# Patient Record
Sex: MALE | Race: WHITE | NOT HISPANIC OR LATINO | Employment: OTHER | URBAN - METROPOLITAN AREA
[De-identification: names, ages, dates, MRNs, and addresses within clinical notes are randomized per-mention and may not be internally consistent; named-entity substitution may affect disease eponyms.]

---

## 2021-09-15 ENCOUNTER — OFFICE VISIT (OUTPATIENT)
Dept: DERMATOLOGY | Facility: CLINIC | Age: 61
End: 2021-09-15
Payer: COMMERCIAL
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
## 2021-09-15 DIAGNOSIS — L82.1 SEBORRHEIC KERATOSES: Primary | ICD-10-CM

## 2021-09-15 PROCEDURE — 99999 PR PBB SHADOW E&M-EST. PATIENT-LVL II: CPT | Mod: PBBFAC,,, | Performed by: STUDENT IN AN ORGANIZED HEALTH CARE EDUCATION/TRAINING PROGRAM

## 2021-09-15 PROCEDURE — 1160F RVW MEDS BY RX/DR IN RCRD: CPT | Mod: CPTII,S$GLB,, | Performed by: STUDENT IN AN ORGANIZED HEALTH CARE EDUCATION/TRAINING PROGRAM

## 2021-09-15 PROCEDURE — 99203 OFFICE O/P NEW LOW 30 MIN: CPT | Mod: S$GLB,,, | Performed by: STUDENT IN AN ORGANIZED HEALTH CARE EDUCATION/TRAINING PROGRAM

## 2021-09-15 PROCEDURE — 1160F PR REVIEW ALL MEDS BY PRESCRIBER/CLIN PHARMACIST DOCUMENTED: ICD-10-PCS | Mod: CPTII,S$GLB,, | Performed by: STUDENT IN AN ORGANIZED HEALTH CARE EDUCATION/TRAINING PROGRAM

## 2021-09-15 PROCEDURE — 1159F PR MEDICATION LIST DOCUMENTED IN MEDICAL RECORD: ICD-10-PCS | Mod: CPTII,S$GLB,, | Performed by: STUDENT IN AN ORGANIZED HEALTH CARE EDUCATION/TRAINING PROGRAM

## 2021-09-15 PROCEDURE — 99999 PR PBB SHADOW E&M-EST. PATIENT-LVL II: ICD-10-PCS | Mod: PBBFAC,,, | Performed by: STUDENT IN AN ORGANIZED HEALTH CARE EDUCATION/TRAINING PROGRAM

## 2021-09-15 PROCEDURE — 99203 PR OFFICE/OUTPT VISIT, NEW, LEVL III, 30-44 MIN: ICD-10-PCS | Mod: S$GLB,,, | Performed by: STUDENT IN AN ORGANIZED HEALTH CARE EDUCATION/TRAINING PROGRAM

## 2021-09-15 PROCEDURE — 1159F MED LIST DOCD IN RCRD: CPT | Mod: CPTII,S$GLB,, | Performed by: STUDENT IN AN ORGANIZED HEALTH CARE EDUCATION/TRAINING PROGRAM

## 2023-01-28 ENCOUNTER — HOSPITAL ENCOUNTER (OUTPATIENT)
Facility: OTHER | Age: 63
Discharge: HOME | End: 2023-01-29
Attending: EMERGENCY MEDICINE | Admitting: EMERGENCY MEDICINE
Payer: COMMERCIAL

## 2023-01-28 DIAGNOSIS — R07.9 CHEST PAIN: ICD-10-CM

## 2023-01-28 DIAGNOSIS — R55 SYNCOPE, UNSPECIFIED SYNCOPE TYPE: Primary | ICD-10-CM

## 2023-01-28 DIAGNOSIS — R00.2 PALPITATIONS: ICD-10-CM

## 2023-01-28 LAB
ALBUMIN SERPL BCP-MCNC: 4.4 G/DL (ref 3.5–5.2)
ALP SERPL-CCNC: 69 U/L (ref 55–135)
ALT SERPL W/O P-5'-P-CCNC: 19 U/L (ref 10–44)
ANION GAP SERPL CALC-SCNC: 10 MMOL/L (ref 8–16)
AST SERPL-CCNC: 23 U/L (ref 10–40)
BASOPHILS # BLD AUTO: 0.05 K/UL (ref 0–0.2)
BASOPHILS NFR BLD: 0.9 % (ref 0–1.9)
BILIRUB SERPL-MCNC: 1 MG/DL (ref 0.1–1)
BNP SERPL-MCNC: 20 PG/ML (ref 0–99)
BUN SERPL-MCNC: 17 MG/DL (ref 6–20)
CALCIUM SERPL-MCNC: 9.5 MG/DL (ref 8.7–10.5)
CHLORIDE SERPL-SCNC: 106 MMOL/L (ref 95–110)
CO2 SERPL-SCNC: 24 MMOL/L (ref 23–29)
CREAT SERPL-MCNC: 1.1 MG/DL (ref 0.5–1.4)
CTP QC/QA: YES
DIFFERENTIAL METHOD: ABNORMAL
EOSINOPHIL # BLD AUTO: 0 K/UL (ref 0–0.5)
EOSINOPHIL NFR BLD: 0.5 % (ref 0–8)
ERYTHROCYTE [DISTWIDTH] IN BLOOD BY AUTOMATED COUNT: 12.3 % (ref 11.5–14.5)
EST. GFR  (NO RACE VARIABLE): >60 ML/MIN/1.73 M^2
GLUCOSE SERPL-MCNC: 108 MG/DL (ref 70–110)
HCT VFR BLD AUTO: 49.4 % (ref 40–54)
HCV AB SERPL QL IA: NEGATIVE
HGB BLD-MCNC: 16.6 G/DL (ref 14–18)
HIV 1+2 AB+HIV1 P24 AG SERPL QL IA: NEGATIVE
IMM GRANULOCYTES # BLD AUTO: 0.03 K/UL (ref 0–0.04)
IMM GRANULOCYTES NFR BLD AUTO: 0.5 % (ref 0–0.5)
LYMPHOCYTES # BLD AUTO: 1.3 K/UL (ref 1–4.8)
LYMPHOCYTES NFR BLD: 22.9 % (ref 18–48)
MAGNESIUM SERPL-MCNC: 2.1 MG/DL (ref 1.6–2.6)
MCH RBC QN AUTO: 30.5 PG (ref 27–31)
MCHC RBC AUTO-ENTMCNC: 33.6 G/DL (ref 32–36)
MCV RBC AUTO: 91 FL (ref 82–98)
MONOCYTES # BLD AUTO: 0.5 K/UL (ref 0.3–1)
MONOCYTES NFR BLD: 7.7 % (ref 4–15)
NEUTROPHILS # BLD AUTO: 3.9 K/UL (ref 1.8–7.7)
NEUTROPHILS NFR BLD: 67.5 % (ref 38–73)
NRBC BLD-RTO: 0 /100 WBC
PHOSPHATE SERPL-MCNC: 3.5 MG/DL (ref 2.7–4.5)
PLATELET # BLD AUTO: 233 K/UL (ref 150–450)
PMV BLD AUTO: 8.9 FL (ref 9.2–12.9)
POTASSIUM SERPL-SCNC: 4 MMOL/L (ref 3.5–5.1)
PROT SERPL-MCNC: 7.6 G/DL (ref 6–8.4)
RBC # BLD AUTO: 5.44 M/UL (ref 4.6–6.2)
SARS-COV-2 RDRP RESP QL NAA+PROBE: NEGATIVE
SODIUM SERPL-SCNC: 140 MMOL/L (ref 136–145)
TROPONIN I SERPL DL<=0.01 NG/ML-MCNC: <0.006 NG/ML (ref 0–0.03)
TROPONIN I SERPL DL<=0.01 NG/ML-MCNC: <0.006 NG/ML (ref 0–0.03)
TSH SERPL DL<=0.005 MIU/L-ACNC: 1.64 UIU/ML (ref 0.4–4)
WBC # BLD AUTO: 5.82 K/UL (ref 3.9–12.7)

## 2023-01-28 PROCEDURE — 93005 ELECTROCARDIOGRAM TRACING: CPT

## 2023-01-28 PROCEDURE — G0378 HOSPITAL OBSERVATION PER HR: HCPCS

## 2023-01-28 PROCEDURE — 80053 COMPREHEN METABOLIC PANEL: CPT | Performed by: EMERGENCY MEDICINE

## 2023-01-28 PROCEDURE — 25000003 PHARM REV CODE 250: Performed by: EMERGENCY MEDICINE

## 2023-01-28 PROCEDURE — 84484 ASSAY OF TROPONIN QUANT: CPT | Mod: 91 | Performed by: PHYSICIAN ASSISTANT

## 2023-01-28 PROCEDURE — 96361 HYDRATE IV INFUSION ADD-ON: CPT

## 2023-01-28 PROCEDURE — 86803 HEPATITIS C AB TEST: CPT | Performed by: EMERGENCY MEDICINE

## 2023-01-28 PROCEDURE — 93010 ELECTROCARDIOGRAM REPORT: CPT | Mod: ,,, | Performed by: INTERNAL MEDICINE

## 2023-01-28 PROCEDURE — 63600175 PHARM REV CODE 636 W HCPCS: Performed by: PHYSICIAN ASSISTANT

## 2023-01-28 PROCEDURE — 85025 COMPLETE CBC W/AUTO DIFF WBC: CPT | Performed by: EMERGENCY MEDICINE

## 2023-01-28 PROCEDURE — 83880 ASSAY OF NATRIURETIC PEPTIDE: CPT | Performed by: EMERGENCY MEDICINE

## 2023-01-28 PROCEDURE — 84484 ASSAY OF TROPONIN QUANT: CPT | Performed by: EMERGENCY MEDICINE

## 2023-01-28 PROCEDURE — 99285 EMERGENCY DEPT VISIT HI MDM: CPT | Mod: 25

## 2023-01-28 PROCEDURE — 87635 SARS-COV-2 COVID-19 AMP PRB: CPT | Performed by: PHYSICIAN ASSISTANT

## 2023-01-28 PROCEDURE — 87389 HIV-1 AG W/HIV-1&-2 AB AG IA: CPT | Performed by: EMERGENCY MEDICINE

## 2023-01-28 PROCEDURE — 84443 ASSAY THYROID STIM HORMONE: CPT | Performed by: EMERGENCY MEDICINE

## 2023-01-28 PROCEDURE — 84100 ASSAY OF PHOSPHORUS: CPT | Performed by: EMERGENCY MEDICINE

## 2023-01-28 PROCEDURE — 93010 EKG 12-LEAD: ICD-10-PCS | Mod: ,,, | Performed by: INTERNAL MEDICINE

## 2023-01-28 PROCEDURE — 83735 ASSAY OF MAGNESIUM: CPT | Performed by: EMERGENCY MEDICINE

## 2023-01-28 PROCEDURE — 96360 HYDRATION IV INFUSION INIT: CPT

## 2023-01-28 RX ORDER — ASPIRIN 325 MG
325 TABLET ORAL
Status: COMPLETED | OUTPATIENT
Start: 2023-01-28 | End: 2023-01-28

## 2023-01-28 RX ORDER — SODIUM CHLORIDE 0.9 % (FLUSH) 0.9 %
10 SYRINGE (ML) INJECTION
Status: DISCONTINUED | OUTPATIENT
Start: 2023-01-28 | End: 2023-01-29 | Stop reason: HOSPADM

## 2023-01-28 RX ORDER — TALC
6 POWDER (GRAM) TOPICAL NIGHTLY PRN
Status: DISCONTINUED | OUTPATIENT
Start: 2023-01-28 | End: 2023-01-29 | Stop reason: HOSPADM

## 2023-01-28 RX ORDER — ACETAMINOPHEN 325 MG/1
650 TABLET ORAL EVERY 8 HOURS PRN
Status: DISCONTINUED | OUTPATIENT
Start: 2023-01-28 | End: 2023-01-29 | Stop reason: HOSPADM

## 2023-01-28 RX ORDER — SODIUM CHLORIDE, SODIUM LACTATE, POTASSIUM CHLORIDE, CALCIUM CHLORIDE 600; 310; 30; 20 MG/100ML; MG/100ML; MG/100ML; MG/100ML
INJECTION, SOLUTION INTRAVENOUS CONTINUOUS
Status: DISCONTINUED | OUTPATIENT
Start: 2023-01-28 | End: 2023-01-29 | Stop reason: HOSPADM

## 2023-01-28 RX ORDER — ONDANSETRON 2 MG/ML
4 INJECTION INTRAMUSCULAR; INTRAVENOUS EVERY 8 HOURS PRN
Status: DISCONTINUED | OUTPATIENT
Start: 2023-01-28 | End: 2023-01-29 | Stop reason: HOSPADM

## 2023-01-28 RX ADMIN — SODIUM CHLORIDE, POTASSIUM CHLORIDE, SODIUM LACTATE AND CALCIUM CHLORIDE: 600; 310; 30; 20 INJECTION, SOLUTION INTRAVENOUS at 04:01

## 2023-01-28 RX ADMIN — ASPIRIN 325 MG ORAL TABLET 325 MG: 325 PILL ORAL at 12:01

## 2023-01-28 RX ADMIN — SODIUM CHLORIDE, POTASSIUM CHLORIDE, SODIUM LACTATE AND CALCIUM CHLORIDE: 600; 310; 30; 20 INJECTION, SOLUTION INTRAVENOUS at 08:01

## 2023-01-28 NOTE — ED PROVIDER NOTES
"Encounter Date: 1/28/2023    SCRIBE #1 NOTE: I, Idalmis Parker, am scribing for, and in the presence of,  Alayna iDallo MD. I have scribed the following portions of the note - Other sections scribed: HPI, ROS, PE.     History     Chief Complaint   Patient presents with    Palpitations     C/o intermitting palpitations, nausea and LOC that began last night 8pm. -SOB. Unsure of head trauma. VSS     Ten Parr is a 62 y.o. male who presents to the ED with heart palpitations last night. He reports that he began to feel "fluttering" 16 hours ago, but this subsided and he went to sleep. He then reports that he woke up to palpitations again but is unsure of the time. He states that he experienced associated nausea, diffuse abdominal pain, and chest pain. He describes the pain as sharp at onset and then as a burning that radiated "all over." He reports that he attempted to get out of bed to use the restroom 3 times, but was unable to get up due to weakness. During these attempts, he notes that he was also experiencing waves of heart palpitations with his heart rate fluctuating between 60 BPM and 140 BPM. He states that he was eventually able to make it to the restroom but on the way back to bed he experienced a syncopal episode and "woke up on the side of the bed." He denies dyspnea or extremity swelling. He reports that after LOC, he was still experiencing heart palpitations for an unknown period of time but was eventually able to get back to sleep. He states that he was prompted to this ED after explaining this episode to a friend who is a nurse. Pt states that he is currently experiencing chest soreness but is otherwise currently asymptomatic.This is the extent of the patient's complaints at this time.    The history is provided by the patient.   Review of patient's allergies indicates:  No Known Allergies  History reviewed. No pertinent past medical history.  Past Surgical History:   Procedure Laterality Date    " RHINOPLASTY TIP       Family History   Problem Relation Age of Onset    Melanoma Neg Hx      Social History     Tobacco Use    Smoking status: Former     Packs/day: 1.00     Years: 15.00     Pack years: 15.00     Types: Cigarettes     Start date: 9/9/2002    Smokeless tobacco: Never   Substance Use Topics    Alcohol use: Yes     Comment: socially     Review of Systems   Constitutional:  Negative for fever.   HENT:  Negative for sore throat.    Respiratory:  Negative for shortness of breath.    Cardiovascular:  Positive for chest pain and palpitations.   Gastrointestinal:  Positive for abdominal pain and nausea.   Genitourinary:  Negative for dysuria.   Musculoskeletal:  Negative for back pain.        Negative for extremity swelling.    Skin:  Negative for rash.   Neurological:  Positive for syncope and weakness.   Hematological:  Does not bruise/bleed easily.     Physical Exam     Initial Vitals [01/28/23 1155]   BP Pulse Resp Temp SpO2   (!) 152/79 73 17 98.3 °F (36.8 °C) 98 %      MAP       --         Physical Exam    Nursing note and vitals reviewed.  Constitutional: He appears well-developed and well-nourished.   HENT:   Head: Normocephalic and atraumatic.   Eyes: EOM are normal. Pupils are equal, round, and reactive to light.   Neck: Neck supple.   Normal range of motion.  Cardiovascular:  Normal rate, regular rhythm, normal heart sounds and intact distal pulses.           Pulmonary/Chest: Breath sounds normal.   Abdominal: Abdomen is soft. Bowel sounds are normal. He exhibits no distension. There is no abdominal tenderness. There is no rebound and no guarding.   Musculoskeletal:         General: No edema. Normal range of motion.      Cervical back: Normal range of motion and neck supple.     Neurological: He is alert and oriented to person, place, and time.   Skin: Skin is warm and dry.   Psychiatric: He has a normal mood and affect. His behavior is normal. Judgment and thought content normal.       ED Course    Procedures  Labs Reviewed   CBC W/ AUTO DIFFERENTIAL - Abnormal; Notable for the following components:       Result Value    MPV 8.9 (*)     All other components within normal limits    Narrative:     Release to patient->Immediate   COMPREHENSIVE METABOLIC PANEL    Narrative:     Release to patient->Immediate   TROPONIN I    Narrative:     Release to patient->Immediate   B-TYPE NATRIURETIC PEPTIDE    Narrative:     Release to patient->Immediate   TSH    Narrative:     Release to patient->Immediate   HIV 1 / 2 ANTIBODY    Narrative:     Release to patient->Immediate   HEPATITIS C ANTIBODY    Narrative:     Release to patient->Immediate   TROPONIN I     EKG Readings: (Independently Interpreted)   1143 Rate of 74 BPM. Normal sinus rhythm with right bundle branch block. No change from EKG from 2014.      Imaging Results              X-Ray Chest AP Portable (Final result)  Result time 01/28/23 14:05:08      Final result by Miguel Dickson MD (01/28/23 14:05:08)                   Impression:      As above.      Electronically signed by: Miguel Dickson MD  Date:    01/28/2023  Time:    14:05               Narrative:    EXAMINATION:  XR CHEST AP PORTABLE    CLINICAL HISTORY:  Chest Pain;    TECHNIQUE:  Single frontal view of the chest was performed.    FINDINGS:  The lungs are clear.  There is no pneumothorax or pleural fluid.  The cardiac silhouette is not enlarged.  The osseous structures are unremarkable.                                    X-Rays:   Independently Interpreted Readings:   Chest X-Ray: No infiltrate or effusion. No cardiomegaly.    Medications   aspirin tablet 325 mg (325 mg Oral Given 1/28/23 1255)     Medical Decision Making:   History:   Old Medical Records: I decided to obtain old medical records.  Independently Interpreted Test(s):   I have ordered and independently interpreted X-rays - see prior notes.  I have ordered and independently interpreted EKG Reading(s) - see prior notes  Clinical Tests:   Lab  Tests: Ordered and Reviewed  Radiological Study: Ordered and Reviewed  Medical Tests: Ordered and Reviewed  ED Management:  Medical decision-making:   The patient had palpitations for several hours during the night last night.  He was too weak to get out of bed and had urinate so when he finally did get out of bed he had syncopal episode and woke up on the floor.  In the emergency department, the patient has maintained normal sinus rhythm with no palpitations or arrhythmias.  Has a history of a right bundle branch block which is unchanged from an EKG in 2014.  He needs admission for syncopal episode and monitoring for his palpitations.    Differential diagnosis:  Atrial fibrillation, atrial flutter, SVT, V-tach, STEMI, NSTEMI, PVCs    History provided by the patient                   I, Alayna Diallo, personally performed the services described in this documentation. All medical record entries made by the scribe were at my direction and in my presence.  I have reviewed the chart and agree that the record reflects my personal performance and is accurate and complete. Alayna Diallo M.D. 2:22 PM01/28/2023          Clinical Impression:   Final diagnoses:  [R00.2] Palpitations  [R07.9] Chest pain        ED Disposition Condition    Observation Stable                Alayna Diallo MD  01/28/23 9039

## 2023-01-28 NOTE — Clinical Note
Diagnosis: Palpitations [785.1.ICD-9-CM]   Future Attending Provider: DIOR ABARCA [[id number]]   Is the patient being sent to ED Observation?: Yes   Admitting Provider:: DIOR ABARCA [[id number]]

## 2023-01-28 NOTE — H&P
"ED Observation Unit  History and Physical      I assumed care of this patient from the Main ED at onset of observation time, 2:55 PM  on 01/28/2023.       History of Present Illness:    Ten Parr is a 62 y.o. male who presents to the ED with heart palpitations last night. He reports that he began to feel "fluttering" 16 hours ago, but this subsided and he went to sleep. He then reports that he woke up to palpitations again but is unsure of the time. He states that he experienced associated nausea, diffuse abdominal pain, and chest pain. He describes the pain as sharp at onset and then as a burning that radiated "all over." He reports that he attempted to get out of bed to use the restroom 3 times, but was unable to get up due to weakness. During these attempts, he notes that he was also experiencing waves of heart palpitations with his heart rate fluctuating between 60 BPM and 140 BPM. He states that he was eventually able to make it to the restroom but on the way back to bed he experienced a syncopal episode and "woke up on the side of the bed." He denies dyspnea or extremity swelling. He reports that after LOC, he was still experiencing heart palpitations for an unknown period of time but was eventually able to get back to sleep. He states that he was prompted to this ED after explaining this episode to a friend who is a nurse. Pt states that he is currently experiencing chest soreness but is otherwise currently asymptomatic.This is the extent of the patient's complaints at this time.    I reviewed the ED Provider Note dated 01/28/2023  prior to my evaluation of this patient.  I reviewed all labs and imaging performed in the Main ED, prior to patient being placed in Observation. Patient was placed in the ED Observation Unit for syncope    ED course EKG reveals right bundle-branch block unchanged from previous.  Initial workup unremarkable.  PMHx   History reviewed. No pertinent past medical history. " "  Past Surgical History:   Procedure Laterality Date    RHINOPLASTY TIP          Family Hx   Family History   Problem Relation Age of Onset    Melanoma Neg Hx         Social Hx   Social History     Socioeconomic History    Marital status: Single   Tobacco Use    Smoking status: Former     Packs/day: 1.00     Years: 15.00     Pack years: 15.00     Types: Cigarettes     Start date: 9/9/2002    Smokeless tobacco: Never   Substance and Sexual Activity    Alcohol use: Yes     Comment: socially        Vital Signs   Vitals:    01/28/23 1155 01/28/23 1302 01/28/23 1332 01/28/23 1433   BP: (!) 152/79 130/82 132/82 (!) 126/93   BP Location: Left arm      Patient Position: Sitting      Pulse: 73 68 68 69   Resp: 17 20 20 19   Temp: 98.3 °F (36.8 °C)      TempSrc: Oral      SpO2: 98% 98% 99% 97%   Weight: 72.6 kg (160 lb)      Height: 5' 8" (1.727 m)           Review of Systems  See HPI    Physical Exam  Nursing note and vitals reviewed.  Constitutional: He appears well-developed and well-nourished.   HENT:   Head: Normocephalic and atraumatic.   Eyes: EOM are normal. Pupils are equal, round, and reactive to light.   Neck: Neck supple.   Normal range of motion.  Cardiovascular:  Normal rate, regular rhythm, normal heart sounds and intact distal pulses.           Pulmonary/Chest: Breath sounds normal.   Abdominal: Abdomen is soft. Bowel sounds are normal. He exhibits no distension. There is no abdominal tenderness. There is no rebound and no guarding.   Musculoskeletal:         General: No edema. Normal range of motion.      Cervical back: Normal range of motion and neck supple.      Neurological: He is alert and oriented to person, place, and time.   Skin: Skin is warm and dry.   Psychiatric: He has a normal mood and affect. His behavior is normal. Judgment and thought content normal.         Medications:   Scheduled Meds:  Continuous Infusions:   lactated ringers       PRN Meds:.acetaminophen, melatonin, ondansetron, sodium " chloride 0.9%      Assessment/Plan:  1. Palpitations    2. Chest pain      Syncopal episode at some point in the night.  Preceding with nausea and diaphoresis also reported some chest discomfort.  States last episode of discomfort was earlier today.  Denies any recurrent syncopal episodes   Described as discomfort.  Received 325 aspirin in ED    Case was discussed with the ED provider, and cardiology

## 2023-01-28 NOTE — ED TRIAGE NOTES
"Pt presents to the ED w/ c/o syncopal episode yesterday after experiencing palpitations. Pt unsure if he hit his head or how long he was unconscious for. Pt denies blood thinner use. Pt states "I feel palpitations every now and then but it always goes away so I never got it checked out." Pt denies SOB.   "

## 2023-01-29 VITALS
OXYGEN SATURATION: 96 % | WEIGHT: 168 LBS | BODY MASS INDEX: 25.46 KG/M2 | DIASTOLIC BLOOD PRESSURE: 59 MMHG | TEMPERATURE: 98 F | HEART RATE: 65 BPM | HEIGHT: 68 IN | SYSTOLIC BLOOD PRESSURE: 122 MMHG | RESPIRATION RATE: 18 BRPM

## 2023-01-29 LAB
ALBUMIN SERPL BCP-MCNC: 3.5 G/DL (ref 3.5–5.2)
ALP SERPL-CCNC: 57 U/L (ref 55–135)
ALT SERPL W/O P-5'-P-CCNC: 17 U/L (ref 10–44)
ANION GAP SERPL CALC-SCNC: 4 MMOL/L (ref 8–16)
AST SERPL-CCNC: 18 U/L (ref 10–40)
BASOPHILS # BLD AUTO: 0.04 K/UL (ref 0–0.2)
BASOPHILS NFR BLD: 0.8 % (ref 0–1.9)
BILIRUB SERPL-MCNC: 0.7 MG/DL (ref 0.1–1)
BUN SERPL-MCNC: 16 MG/DL (ref 6–20)
CALCIUM SERPL-MCNC: 9 MG/DL (ref 8.7–10.5)
CHLORIDE SERPL-SCNC: 110 MMOL/L (ref 95–110)
CO2 SERPL-SCNC: 27 MMOL/L (ref 23–29)
CREAT SERPL-MCNC: 1 MG/DL (ref 0.5–1.4)
DIFFERENTIAL METHOD: NORMAL
EOSINOPHIL # BLD AUTO: 0.1 K/UL (ref 0–0.5)
EOSINOPHIL NFR BLD: 2.1 % (ref 0–8)
ERYTHROCYTE [DISTWIDTH] IN BLOOD BY AUTOMATED COUNT: 12.3 % (ref 11.5–14.5)
EST. GFR  (NO RACE VARIABLE): >60 ML/MIN/1.73 M^2
GLUCOSE SERPL-MCNC: 96 MG/DL (ref 70–110)
HCT VFR BLD AUTO: 42.4 % (ref 40–54)
HGB BLD-MCNC: 14.3 G/DL (ref 14–18)
IMM GRANULOCYTES # BLD AUTO: 0.01 K/UL (ref 0–0.04)
IMM GRANULOCYTES NFR BLD AUTO: 0.2 % (ref 0–0.5)
LYMPHOCYTES # BLD AUTO: 2 K/UL (ref 1–4.8)
LYMPHOCYTES NFR BLD: 40.8 % (ref 18–48)
MCH RBC QN AUTO: 30.8 PG (ref 27–31)
MCHC RBC AUTO-ENTMCNC: 33.7 G/DL (ref 32–36)
MCV RBC AUTO: 91 FL (ref 82–98)
MONOCYTES # BLD AUTO: 0.4 K/UL (ref 0.3–1)
MONOCYTES NFR BLD: 8.5 % (ref 4–15)
NEUTROPHILS # BLD AUTO: 2.3 K/UL (ref 1.8–7.7)
NEUTROPHILS NFR BLD: 47.6 % (ref 38–73)
NRBC BLD-RTO: 0 /100 WBC
PLATELET # BLD AUTO: 193 K/UL (ref 150–450)
PMV BLD AUTO: 9.3 FL (ref 9.2–12.9)
POTASSIUM SERPL-SCNC: 4.5 MMOL/L (ref 3.5–5.1)
PROT SERPL-MCNC: 5.9 G/DL (ref 6–8.4)
RBC # BLD AUTO: 4.64 M/UL (ref 4.6–6.2)
SODIUM SERPL-SCNC: 141 MMOL/L (ref 136–145)
WBC # BLD AUTO: 4.85 K/UL (ref 3.9–12.7)

## 2023-01-29 PROCEDURE — 63600175 PHARM REV CODE 636 W HCPCS: Performed by: PHYSICIAN ASSISTANT

## 2023-01-29 PROCEDURE — 85025 COMPLETE CBC W/AUTO DIFF WBC: CPT | Performed by: PHYSICIAN ASSISTANT

## 2023-01-29 PROCEDURE — G0378 HOSPITAL OBSERVATION PER HR: HCPCS

## 2023-01-29 PROCEDURE — 96361 HYDRATE IV INFUSION ADD-ON: CPT

## 2023-01-29 PROCEDURE — 80053 COMPREHEN METABOLIC PANEL: CPT | Performed by: PHYSICIAN ASSISTANT

## 2023-01-29 RX ADMIN — SODIUM CHLORIDE, POTASSIUM CHLORIDE, SODIUM LACTATE AND CALCIUM CHLORIDE: 600; 310; 30; 20 INJECTION, SOLUTION INTRAVENOUS at 06:01

## 2023-01-29 NOTE — ED NOTES
Hourly rounding complete. Pt resting comfortably in bed, AAO x4. Resp even & unlabored, with visible cheat rise and fall. Patient denies dyspnea, pain, or any subsequent episodes of palpitations. Pt ambulatory from restroom with no associated dyspnea, dizziness, or weakness. Patient updated on plan of care. Comfort/restroom needs addressed. Call light and personal items within reach. Bed in lowest position, wheels locked, siderails up x2. Pt on continuous cardiac monitoring and remains in normal sinus rhythm with regular rate.

## 2023-01-29 NOTE — DISCHARGE SUMMARY
"[facility] ED Observation Unit  Discharge Summary        History of Present Illness:    Ten Parr is a 62 y.o. male who presents to the ED with heart palpitations last night. He reports that he began to feel "fluttering" 16 hours ago, but this subsided and he went to sleep. He then reports that he woke up to palpitations again but is unsure of the time. He states that he experienced associated nausea, diffuse abdominal pain, and chest pain. He describes the pain as sharp at onset and then as a burning that radiated "all over." He reports that he attempted to get out of bed to use the restroom 3 times, but was unable to get up due to weakness. During these attempts, he notes that he was also experiencing waves of heart palpitations with his heart rate fluctuating between 60 BPM and 140 BPM. He states that he was eventually able to make it to the restroom but on the way back to bed he experienced a syncopal episode and "woke up on the side of the bed." He denies dyspnea or extremity swelling. He reports that after LOC, he was still experiencing heart palpitations for an unknown period of time but was eventually able to get back to sleep. He states that he was prompted to this ED after explaining this episode to a friend who is a nurse. Pt states that he is currently experiencing chest soreness but is otherwise currently asymptomatic.This is the extent of the patient's complaints at this time.     Observation Course:    CBC and CMP grossly unremarkable.  Troponin negative x2.  BNP and TSH WNL.  No acute cardiopulmonary process identified on CXR.  EKG with right bundle branch block unchanged from prior. Patient has had no further episodes of palpitations or syncope while in the ER or EDOU.  Patient was placed in the ED observation unit for evaluation by Cardiology but he states that he has a [employer] to go to and he would prefer to follow-up outpatient as he can not stay any longer.  Workup is reassuring    The " patient displays normal decision making capacity. I explained to the patient the nature of his/her illness, injury, or disease, and the need and advisability of continued in-hospital evaluation / treatment. I explained that the risks of discontinuing medical care at this time include worsening of condition, bleeding, infection, permanent disability, MI, coma, and death. All the patient's questions were answered.  Nonetheless, the patient chooses to leave.  He states that he will absolutely follow-up with cardiology as an outpatient and referral placed.  He would stay if he was able to but he can not miss this [employer] that has been planned for a very long time and is incredibly important to him.  Patient discharged in good condition.  Patient encouraged to return with any new or worsening symptoms.  Patient educated on signs and symptoms to monitor for and when to return to ED. Patient verbalized understanding agrees with treatment plan. All questions and concerns addressed.         Brief Physical Exam/Reassessment   Review of Systems   Constitutional:  Negative for chills and fever.   Eyes:  Negative for blurred vision and double vision.   Respiratory:  Negative for cough and shortness of breath.    Cardiovascular:  Positive for palpitations (denies currently). Negative for chest pain, leg swelling and PND.   Gastrointestinal:  Negative for abdominal pain, nausea and vomiting.   Genitourinary:  Negative for dysuria and flank pain.   Musculoskeletal:  Negative for back pain and myalgias.   Skin:  Negative for rash.   Neurological:  Positive for loss of consciousness. Negative for dizziness, weakness and headaches.   Endo/Heme/Allergies:  Does not bruise/bleed easily.   Psychiatric/Behavioral:  Negative for depression.      Physical Exam    Constitutional: Vital signs are normal. He appears well-developed and well-nourished. He is cooperative.  Non-toxic appearance. He does not have a sickly appearance. No distress.    HENT:   Head: Normocephalic and atraumatic.   Eyes: EOM are normal. Pupils are equal, round, and reactive to light. No scleral icterus.   Neck:   Normal range of motion.  Cardiovascular:  Normal rate, regular rhythm and normal heart sounds.           Pulmonary/Chest: Breath sounds normal. No respiratory distress. He exhibits no tenderness.   Abdominal: Abdomen is soft. Bowel sounds are normal. He exhibits no distension. There is no abdominal tenderness.   Musculoskeletal:         General: No tenderness. Normal range of motion.      Cervical back: Normal range of motion.     Neurological: He is alert and oriented to person, place, and time. He has normal strength. No sensory deficit. GCS score is 15. GCS eye subscore is 4. GCS verbal subscore is 5. GCS motor subscore is 6.   Skin: Skin is warm and dry. Capillary refill takes less than 2 seconds. No erythema.   Psychiatric: He has a normal mood and affect. Thought content normal.       Consultants:    Dr. Gonzales (patient left prior to evaluation)    ED/OBS Workup:  Vitals:    01/29/23 0410 01/29/23 0603 01/29/23 0607 01/29/23 0700   BP:   (!) 122/59    Pulse: (!) 56 (!) 56 63 62   Resp:   18    Temp:   98 °F (36.7 °C)    TempSrc:   Oral    SpO2:   96%    Weight:       Height:        01/29/23 0800   BP:    Pulse: 65   Resp:    Temp:    TempSrc:    SpO2:    Weight:    Height:        Labs Reviewed   CBC W/ AUTO DIFFERENTIAL - Abnormal; Notable for the following components:       Result Value    MPV 8.9 (*)     All other components within normal limits    Narrative:     Release to patient->Immediate   COMPREHENSIVE METABOLIC PANEL - Abnormal; Notable for the following components:    Total Protein 5.9 (*)     Anion Gap 4 (*)     All other components within normal limits   COMPREHENSIVE METABOLIC PANEL    Narrative:     Release to patient->Immediate   TROPONIN I    Narrative:     Release to patient->Immediate   B-TYPE NATRIURETIC PEPTIDE    Narrative:     Release to  patient->Immediate   TSH    Narrative:     Release to patient->Immediate   HIV 1 / 2 ANTIBODY    Narrative:     Release to patient->Immediate   HEPATITIS C ANTIBODY    Narrative:     Release to patient->Immediate   MAGNESIUM   PHOSPHORUS   MAGNESIUM    Narrative:     Release to patient->Immediate   PHOSPHORUS    Narrative:     Release to patient->Immediate   TROPONIN I   CBC W/ AUTO DIFFERENTIAL   SARS-COV-2 RDRP GENE       X-Ray Chest AP Portable   Final Result      As above.         Electronically signed by: Miguel Dickson MD   Date:    01/28/2023   Time:    14:05          Final Diagnosis:  1. Syncope, unspecified syncope type    2. Palpitations    3. Chest pain        Discharge Condition: Good    Disposition: Home or Self Care     Time spent on the discharge of the patient including review of hospital course with the patient. reviewing discharge medications and arranging follow-up care 35 minutes.  Patient was seen and examined on the date of discharge and determined to be suitable for discharge.    Follow Up:   ED Disposition Condition    Discharge Good            ED Prescriptions    None       Follow-up Information       Follow up With Specialties Details Why Contact Info    Pedro Gonzales MD Interventional Cardiology, Nuclear Medicine, Cardiovascular Disease, Cardiology   [address] [address]  [phone]              No future appointments.

## 2023-01-29 NOTE — ED NOTES
After updating on plan of care, patient informed RN that he would like to leave AMA. ED provider messaged via secure chat.

## 2023-02-16 ENCOUNTER — OFFICE VISIT (OUTPATIENT)
Dept: CARDIOLOGY | Facility: CLINIC | Age: 63
End: 2023-02-16
Payer: COMMERCIAL

## 2023-02-16 VITALS
HEIGHT: 68 IN | OXYGEN SATURATION: 98 % | WEIGHT: 161.5 LBS | HEART RATE: 84 BPM | BODY MASS INDEX: 24.48 KG/M2 | DIASTOLIC BLOOD PRESSURE: 80 MMHG | SYSTOLIC BLOOD PRESSURE: 116 MMHG

## 2023-02-16 DIAGNOSIS — R55 SYNCOPE AND COLLAPSE: ICD-10-CM

## 2023-02-16 DIAGNOSIS — I45.10 RBBB: ICD-10-CM

## 2023-02-16 DIAGNOSIS — R07.89 OTHER CHEST PAIN: ICD-10-CM

## 2023-02-16 DIAGNOSIS — R00.2 PALPITATIONS: ICD-10-CM

## 2023-02-16 DIAGNOSIS — R55 SYNCOPE, UNSPECIFIED SYNCOPE TYPE: ICD-10-CM

## 2023-02-16 PROCEDURE — 3074F SYST BP LT 130 MM HG: CPT | Mod: CPTII,S$GLB,, | Performed by: INTERNAL MEDICINE

## 2023-02-16 PROCEDURE — 99204 PR OFFICE/OUTPT VISIT, NEW, LEVL IV, 45-59 MIN: ICD-10-PCS | Mod: S$GLB,,, | Performed by: INTERNAL MEDICINE

## 2023-02-16 PROCEDURE — 3008F PR BODY MASS INDEX (BMI) DOCUMENTED: ICD-10-PCS | Mod: CPTII,S$GLB,, | Performed by: INTERNAL MEDICINE

## 2023-02-16 PROCEDURE — 3008F BODY MASS INDEX DOCD: CPT | Mod: CPTII,S$GLB,, | Performed by: INTERNAL MEDICINE

## 2023-02-16 PROCEDURE — 1159F MED LIST DOCD IN RCRD: CPT | Mod: CPTII,S$GLB,, | Performed by: INTERNAL MEDICINE

## 2023-02-16 PROCEDURE — 3074F PR MOST RECENT SYSTOLIC BLOOD PRESSURE < 130 MM HG: ICD-10-PCS | Mod: CPTII,S$GLB,, | Performed by: INTERNAL MEDICINE

## 2023-02-16 PROCEDURE — 3079F DIAST BP 80-89 MM HG: CPT | Mod: CPTII,S$GLB,, | Performed by: INTERNAL MEDICINE

## 2023-02-16 PROCEDURE — 1159F PR MEDICATION LIST DOCUMENTED IN MEDICAL RECORD: ICD-10-PCS | Mod: CPTII,S$GLB,, | Performed by: INTERNAL MEDICINE

## 2023-02-16 PROCEDURE — 99204 OFFICE O/P NEW MOD 45 MIN: CPT | Mod: S$GLB,,, | Performed by: INTERNAL MEDICINE

## 2023-02-16 PROCEDURE — 99999 PR PBB SHADOW E&M-EST. PATIENT-LVL III: ICD-10-PCS | Mod: PBBFAC,,, | Performed by: INTERNAL MEDICINE

## 2023-02-16 PROCEDURE — 3079F PR MOST RECENT DIASTOLIC BLOOD PRESSURE 80-89 MM HG: ICD-10-PCS | Mod: CPTII,S$GLB,, | Performed by: INTERNAL MEDICINE

## 2023-02-16 PROCEDURE — 99999 PR PBB SHADOW E&M-EST. PATIENT-LVL III: CPT | Mod: PBBFAC,,, | Performed by: INTERNAL MEDICINE

## 2023-02-16 NOTE — PROGRESS NOTES
Cardiology    2/16/2023  12:05 PM    Problem list  Patient Active Problem List   Diagnosis    Nasal bones, closed fracture    Syncope and collapse    Palpitations    RBBB       CC:  syncope    HPI:  Patient is referred for ER follow-up.  He went to emergency room on January 28 with syncope.  He had to leave prior to evaluation.  He was referred for outpatient follow-up.  He described feeling severe sharp pain all over his body 1 night and when he got up to use the restroom, on the way back he passed out.  He was by himself.  He denies any chest pain before or after.  He denies any urinary or fecal incontinence.  He is very active.  He exercises regularly.  He is able to do 1 to 1-1/2 hour on a treadmill without any exertional symptoms.  He is a retired .  Family history is negative for premature coronary disease.  His father had MI in his late 80s and brother isn late 60s.  He does not smoke.  He drinks alcohol socially.  He is seen cardiologist 10 years ago underwent workup which included a stress test.  He also has known to have R bundle branch block for 10 years.    Medications  No current outpatient medications on file.     No current facility-administered medications for this visit.      Prior to Admission medications    Medication Sig Start Date End Date Taking? Authorizing Provider   testosterone cypionate (DEPOTESTOTERONE CYPIONATE) 100 mg/mL injection Inject 50 mg into the muscle every 7 days.  2/16/23  Historical Provider         History  No past medical history on file.  Past Surgical History:   Procedure Laterality Date    RHINOPLASTY TIP       Social History     Socioeconomic History    Marital status: Single   Tobacco Use    Smoking status: Former     Packs/day: 1.00     Years: 15.00     Pack years: 15.00     Types: Cigarettes     Start date: 9/9/2002    Smokeless tobacco: Never   Substance and Sexual Activity    Alcohol use: Yes     Comment: socially    Drug use: Never          Allergies  Review of patient's allergies indicates:  No Known Allergies      Review of Systems   Review of Systems   Constitutional: Negative for decreased appetite, fever and weight loss.   HENT:  Negative for congestion and nosebleeds.    Eyes:  Negative for double vision, vision loss in left eye, vision loss in right eye and visual disturbance.   Cardiovascular:  Positive for chest pain, palpitations and syncope. Negative for claudication, cyanosis, dyspnea on exertion, irregular heartbeat, leg swelling, near-syncope, orthopnea and paroxysmal nocturnal dyspnea.   Respiratory:  Negative for cough, hemoptysis, shortness of breath, sleep disturbances due to breathing, snoring, sputum production and wheezing.    Endocrine: Negative for cold intolerance and heat intolerance.   Skin:  Negative for nail changes and rash.   Musculoskeletal:  Negative for joint pain, muscle cramps, muscle weakness and myalgias.   Gastrointestinal:  Negative for change in bowel habit, heartburn, hematemesis, hematochezia, hemorrhoids and melena.   Neurological:  Negative for dizziness, focal weakness and headaches.       Physical Exam  Wt Readings from Last 1 Encounters:   02/16/23 73.3 kg (161 lb 8 oz)     BP Readings from Last 3 Encounters:   02/16/23 116/80   01/29/23 (!) 122/59   10/07/14 (!) 157/87     Pulse Readings from Last 1 Encounters:   02/16/23 84     Body mass index is 24.56 kg/m².    Physical Exam  Vitals reviewed.   Constitutional:       Appearance: He is well-developed.   HENT:      Head: Atraumatic.   Eyes:      General: No scleral icterus.  Neck:      Vascular: Normal carotid pulses. No carotid bruit, hepatojugular reflux or JVD.   Cardiovascular:      Rate and Rhythm: Normal rate and regular rhythm.      Chest Wall: PMI is not displaced.      Pulses: Intact distal pulses.           Carotid pulses are 2+ on the right side and 2+ on the left side.       Radial pulses are 2+ on the right side and 2+ on the left  side.        Dorsalis pedis pulses are 2+ on the right side and 2+ on the left side.      Heart sounds: Normal heart sounds, S1 normal and S2 normal. No murmur heard.    No friction rub.   Pulmonary:      Effort: Pulmonary effort is normal. No respiratory distress.      Breath sounds: Normal breath sounds. No stridor. No wheezing or rales.   Chest:      Chest wall: No tenderness.   Abdominal:      General: Bowel sounds are normal.      Palpations: Abdomen is soft.   Musculoskeletal:      Cervical back: Neck supple. No edema.   Skin:     General: Skin is warm and dry.      Nails: There is no clubbing.   Neurological:      Mental Status: He is alert and oriented to person, place, and time.   Psychiatric:         Behavior: Behavior normal.         Thought Content: Thought content normal.           Assessment  1. Palpitations  Being evaluated  - Ambulatory referral/consult to Cardiology  - Echo; Future  - Cardiac event monitor; Future  - Exercise Stress - EKG; Future    2. Chest pain  Being assessed  - Ambulatory referral/consult to Cardiology  - Echo; Future  - Cardiac event monitor; Future  - Exercise Stress - EKG; Future    3. Syncope, unspecified syncope type  Being assessed  - Ambulatory referral/consult to Cardiology    5. RBBB  Stable since 2014        Plan and Discussion  Discussed his syncopal episode.  It may be from vasovagal due to excruciating body pain.  However given his right bundle-branch block, palpitation and chest pain, will proceed with a cardiac workup including treadmill stress test, echocardiogram and event monitor.  His labs from his hospital stay on January 28 was reviewed.    Follow Up  1 month      Pedro Gonzales MD, F.A.C.C, F.S.C.A.I.        35 minutes were spent in chart review, documentation and review of results, and evaluation, treatment, and counseling of patient on the same day of service.    Disclaimer: This document was created using voice recognition software ([employer]). Although it may be edited, this document may contain errors related to incorrect recognition of the spoken word. Please call the physician if clarification is needed.

## 2023-02-24 ENCOUNTER — HOSPITAL ENCOUNTER (OUTPATIENT)
Dept: CARDIOLOGY | Facility: OTHER | Age: 63
Discharge: HOME | End: 2023-02-24
Attending: INTERNAL MEDICINE
Payer: COMMERCIAL

## 2023-02-24 ENCOUNTER — PATIENT MESSAGE (OUTPATIENT)
Dept: CARDIOLOGY | Facility: CLINIC | Age: 63
End: 2023-02-24

## 2023-02-24 VITALS
BODY MASS INDEX: 24.4 KG/M2 | WEIGHT: 161 LBS | SYSTOLIC BLOOD PRESSURE: 116 MMHG | DIASTOLIC BLOOD PRESSURE: 80 MMHG | HEART RATE: 84 BPM | HEIGHT: 68 IN

## 2023-02-24 DIAGNOSIS — R00.2 PALPITATIONS: ICD-10-CM

## 2023-02-24 DIAGNOSIS — R07.89 OTHER CHEST PAIN: ICD-10-CM

## 2023-02-24 PROCEDURE — 93306 ECHO (CUPID ONLY): ICD-10-PCS | Mod: 26,,, | Performed by: INTERNAL MEDICINE

## 2023-02-24 PROCEDURE — 93306 TTE W/DOPPLER COMPLETE: CPT | Mod: 26,,, | Performed by: INTERNAL MEDICINE

## 2023-02-24 PROCEDURE — 93306 TTE W/DOPPLER COMPLETE: CPT

## 2023-02-27 LAB
AV INDEX (PROSTH): 0.93
AV MEAN GRADIENT: 3 MMHG
AV PEAK GRADIENT: 6 MMHG
AV VALVE AREA: 3.67 CM2
AV VELOCITY RATIO: 0.83
BSA FOR ECHO PROCEDURE: 1.87 M2
CV ECHO LV RWT: 0.36 CM
DOP CALC AO PEAK VEL: 1.2 M/S
DOP CALC AO VTI: 17.6 CM
DOP CALC LVOT AREA: 3.9 CM2
DOP CALC LVOT DIAMETER: 2.24 CM
DOP CALC LVOT PEAK VEL: 1 M/S
DOP CALC LVOT STROKE VOLUME: 64.6 CM3
DOP CALCLVOT PEAK VEL VTI: 16.4 CM
E WAVE DECELERATION TIME: 290.25 MSEC
E/A RATIO: 0.67
E/E' RATIO: 7.47 M/S
ECHO LV POSTERIOR WALL: 0.75 CM (ref 0.6–1.1)
EJECTION FRACTION: 70 %
FRACTIONAL SHORTENING: 45 % (ref 28–44)
INTERVENTRICULAR SEPTUM: 0.7 CM (ref 0.6–1.1)
IVRT: 83.04 MSEC
LA MAJOR: 3.01 CM
LA MINOR: 3.03 CM
LA WIDTH: 3.1 CM
LEFT ATRIUM SIZE: 2.75 CM
LEFT ATRIUM VOLUME INDEX MOD: 18.3 ML/M2
LEFT ATRIUM VOLUME INDEX: 11.8 ML/M2
LEFT ATRIUM VOLUME MOD: 34 CM3
LEFT ATRIUM VOLUME: 21.88 CM3
LEFT INTERNAL DIMENSION IN SYSTOLE: 2.3 CM (ref 2.1–4)
LEFT VENTRICLE DIASTOLIC VOLUME INDEX: 41.1 ML/M2
LEFT VENTRICLE DIASTOLIC VOLUME: 76.45 ML
LEFT VENTRICLE MASS INDEX: 47 G/M2
LEFT VENTRICLE SYSTOLIC VOLUME INDEX: 9.8 ML/M2
LEFT VENTRICLE SYSTOLIC VOLUME: 18.21 ML
LEFT VENTRICULAR INTERNAL DIMENSION IN DIASTOLE: 4.15 CM (ref 3.5–6)
LEFT VENTRICULAR MASS: 87.25 G
LV LATERAL E/E' RATIO: 6.22 M/S
LV SEPTAL E/E' RATIO: 9.33 M/S
LVOT MG: 1.96 MMHG
LVOT MV: 0.65 CM/S
MV PEAK A VEL: 0.83 M/S
MV PEAK E VEL: 0.56 M/S
MV STENOSIS PRESSURE HALF TIME: 84.17 MS
MV VALVE AREA P 1/2 METHOD: 2.61 CM2
PISA MRMAX VEL: 3.12 M/S
PISA TR MAX VEL: 1.92 M/S
PULM VEIN S/D RATIO: 2.22
PV PEAK D VEL: 0.27 M/S
PV PEAK S VEL: 0.6 M/S
PV PEAK VELOCITY: 1.13 CM/S
RA MAJOR: 4 CM
RA WIDTH: 2.4 CM
TDI LATERAL: 0.09 M/S
TDI SEPTAL: 0.06 M/S
TDI: 0.08 M/S
TR MAX PG: 15 MMHG
TRICUSPID ANNULAR PLANE SYSTOLIC EXCURSION: 1.6 CM

## 2023-03-03 ENCOUNTER — PATIENT MESSAGE (OUTPATIENT)
Dept: CARDIOLOGY | Facility: CLINIC | Age: 63
End: 2023-03-03
Payer: COMMERCIAL

## 2023-03-13 ENCOUNTER — CLINICAL SUPPORT (OUTPATIENT)
Dept: CARDIOLOGY | Facility: HOSPITAL | Age: 63
End: 2023-03-13
Attending: INTERNAL MEDICINE
Payer: COMMERCIAL

## 2023-03-13 DIAGNOSIS — R00.2 PALPITATIONS: ICD-10-CM

## 2023-03-13 DIAGNOSIS — R07.89 OTHER CHEST PAIN: ICD-10-CM

## 2023-03-13 PROCEDURE — 93270 REMOTE 30 DAY ECG REV/REPORT: CPT

## 2023-03-13 PROCEDURE — 93272 ECG/REVIEW INTERPRET ONLY: CPT | Mod: ,,, | Performed by: INTERNAL MEDICINE

## 2023-03-13 PROCEDURE — 93272 CARDIAC EVENT MONITOR (CUPID ONLY): ICD-10-PCS | Mod: ,,, | Performed by: INTERNAL MEDICINE

## 2023-03-20 ENCOUNTER — HOSPITAL ENCOUNTER (OUTPATIENT)
Dept: CARDIOLOGY | Facility: OTHER | Age: 63
Discharge: HOME | End: 2023-03-20
Attending: INTERNAL MEDICINE
Payer: COMMERCIAL

## 2023-03-20 VITALS
SYSTOLIC BLOOD PRESSURE: 130 MMHG | HEIGHT: 68 IN | WEIGHT: 161 LBS | HEART RATE: 62 BPM | BODY MASS INDEX: 24.4 KG/M2 | DIASTOLIC BLOOD PRESSURE: 82 MMHG

## 2023-03-20 DIAGNOSIS — R07.89 OTHER CHEST PAIN: ICD-10-CM

## 2023-03-20 DIAGNOSIS — R00.2 PALPITATIONS: ICD-10-CM

## 2023-03-20 LAB
CV STRESS BASE HR: 62 BPM
DIASTOLIC BLOOD PRESSURE: 82 MMHG
OHS CV CPX 1 MINUTE RECOVERY HEART RATE: 144 BPM
OHS CV CPX 85 PERCENT MAX PREDICTED HEART RATE MALE: 136
OHS CV CPX ESTIMATED METS: 14
OHS CV CPX MAX PREDICTED HEART RATE: 160
OHS CV CPX PATIENT IS FEMALE: 0
OHS CV CPX PATIENT IS MALE: 1
OHS CV CPX PEAK DIASTOLIC BLOOD PRESSURE: 78 MMHG
OHS CV CPX PEAK HEAR RATE: 166 BPM
OHS CV CPX PEAK RATE PRESSURE PRODUCT: NORMAL
OHS CV CPX PEAK SYSTOLIC BLOOD PRESSURE: 167 MMHG
OHS CV CPX PERCENT MAX PREDICTED HEART RATE ACHIEVED: 104
OHS CV CPX RATE PRESSURE PRODUCT PRESENTING: 8060
STRESS ANGINA INDEX: 0
STRESS DUKE TREADMILL SCORE: 12
STRESS ECHO POST EXERCISE DUR MIN: 12 MINUTES
STRESS ECHO POST EXERCISE DUR SEC: 15 SECONDS
STRESS ST DEPRESSION: 0 MM
STRESS ST ELEVATION: 0 MM
SYSTOLIC BLOOD PRESSURE: 130 MMHG

## 2023-03-20 PROCEDURE — 93018 EXERCISE STRESS - EKG (CUPID ONLY): ICD-10-PCS | Mod: ,,, | Performed by: INTERNAL MEDICINE

## 2023-03-20 PROCEDURE — 93018 CV STRESS TEST I&R ONLY: CPT | Mod: ,,, | Performed by: INTERNAL MEDICINE

## 2023-03-20 PROCEDURE — 93016 EXERCISE STRESS - EKG (CUPID ONLY): ICD-10-PCS | Mod: ,,, | Performed by: INTERNAL MEDICINE

## 2023-03-20 PROCEDURE — 93016 CV STRESS TEST SUPVJ ONLY: CPT | Mod: ,,, | Performed by: INTERNAL MEDICINE

## 2023-03-20 PROCEDURE — 93017 CV STRESS TEST TRACING ONLY: CPT

## 2023-03-22 ENCOUNTER — TELEPHONE (OUTPATIENT)
Dept: CARDIOLOGY | Facility: OTHER | Age: 63
End: 2023-03-22
Payer: COMMERCIAL

## 2023-03-22 ENCOUNTER — TELEPHONE (OUTPATIENT)
Dept: ELECTROPHYSIOLOGY | Facility: CLINIC | Age: 63
End: 2023-03-22
Payer: COMMERCIAL

## 2023-03-22 RX ORDER — METOPROLOL TARTRATE 25 MG/1
25 TABLET, FILM COATED ORAL 2 TIMES DAILY PRN
Qty: 60 TABLET | Refills: 11 | Status: SHIPPED | OUTPATIENT
Start: 2023-03-22 | End: 2024-03-21

## 2023-03-22 NOTE — TELEPHONE ENCOUNTER
"Patient wearing 30 day event monitor for diagnosis palpitations and Syncope and collapse     Received symptom episode for "Racing/Fluttering" on 3/21/23 @ 09:02 PM  Egram c/w SR , IVCD, with PVCs with AF viewable episode of AF for 1 min 53 secs, termination not seen        Called pt at 0913 today who reports he has had this feeling of fluttering/ racing heart rate once a month for the last year.     Twice in the past ,when he had the fluttering with chest pain and once had syncope.    Denies syncope last night. He also states a MD contacted him, it appears Dr. Le was contacted by [facility].  Pt not on OAC or any meds    Strips placed under this encounter for review. Message sent to ordering provider Dr. Pedro Gonzales. Will continue to monitor until  4/15/2023                            _____  "

## 2023-03-22 NOTE — TELEPHONE ENCOUNTER
Spoke to patient.  Patient activated his event monitor which showed atrial fibrillation.  He had palpitations.  He did not have any dizziness or syncope.  Discussed that his [id number] score is 0 and will not need anticoagulation.  Will try metoprolol tartrate 25 mg as needed for palpitations.

## 2023-04-17 ENCOUNTER — OFFICE VISIT (OUTPATIENT)
Dept: CARDIOLOGY | Facility: CLINIC | Age: 63
End: 2023-04-17
Payer: COMMERCIAL

## 2023-04-17 VITALS
WEIGHT: 159.88 LBS | DIASTOLIC BLOOD PRESSURE: 80 MMHG | SYSTOLIC BLOOD PRESSURE: 116 MMHG | OXYGEN SATURATION: 96 % | HEART RATE: 73 BPM | BODY MASS INDEX: 24.31 KG/M2

## 2023-04-17 DIAGNOSIS — I48.0 PAF (PAROXYSMAL ATRIAL FIBRILLATION): ICD-10-CM

## 2023-04-17 DIAGNOSIS — I45.10 RBBB: Primary | ICD-10-CM

## 2023-04-17 DIAGNOSIS — R00.2 PALPITATIONS: ICD-10-CM

## 2023-04-17 PROCEDURE — 1159F MED LIST DOCD IN RCRD: CPT | Mod: CPTII,S$GLB,, | Performed by: INTERNAL MEDICINE

## 2023-04-17 PROCEDURE — 99999 PR PBB SHADOW E&M-EST. PATIENT-LVL III: CPT | Mod: PBBFAC,,, | Performed by: INTERNAL MEDICINE

## 2023-04-17 PROCEDURE — 99214 OFFICE O/P EST MOD 30 MIN: CPT | Mod: S$GLB,,, | Performed by: INTERNAL MEDICINE

## 2023-04-17 PROCEDURE — 3074F SYST BP LT 130 MM HG: CPT | Mod: CPTII,S$GLB,, | Performed by: INTERNAL MEDICINE

## 2023-04-17 PROCEDURE — 3079F DIAST BP 80-89 MM HG: CPT | Mod: CPTII,S$GLB,, | Performed by: INTERNAL MEDICINE

## 2023-04-17 PROCEDURE — 1159F PR MEDICATION LIST DOCUMENTED IN MEDICAL RECORD: ICD-10-PCS | Mod: CPTII,S$GLB,, | Performed by: INTERNAL MEDICINE

## 2023-04-17 PROCEDURE — 3074F PR MOST RECENT SYSTOLIC BLOOD PRESSURE < 130 MM HG: ICD-10-PCS | Mod: CPTII,S$GLB,, | Performed by: INTERNAL MEDICINE

## 2023-04-17 PROCEDURE — 3079F PR MOST RECENT DIASTOLIC BLOOD PRESSURE 80-89 MM HG: ICD-10-PCS | Mod: CPTII,S$GLB,, | Performed by: INTERNAL MEDICINE

## 2023-04-17 PROCEDURE — 99214 PR OFFICE/OUTPT VISIT, EST, LEVL IV, 30-39 MIN: ICD-10-PCS | Mod: S$GLB,,, | Performed by: INTERNAL MEDICINE

## 2023-04-17 PROCEDURE — 99999 PR PBB SHADOW E&M-EST. PATIENT-LVL III: ICD-10-PCS | Mod: PBBFAC,,, | Performed by: INTERNAL MEDICINE

## 2023-04-17 PROCEDURE — 3008F PR BODY MASS INDEX (BMI) DOCUMENTED: ICD-10-PCS | Mod: CPTII,S$GLB,, | Performed by: INTERNAL MEDICINE

## 2023-04-17 PROCEDURE — 3008F BODY MASS INDEX DOCD: CPT | Mod: CPTII,S$GLB,, | Performed by: INTERNAL MEDICINE

## 2023-04-17 RX ORDER — BENZONATATE 100 MG/1
100 CAPSULE ORAL 3 TIMES DAILY PRN
Qty: 30 CAPSULE | Refills: 0 | Status: SHIPPED | OUTPATIENT
Start: 2023-04-17 | End: 2023-04-27

## 2023-04-17 NOTE — PROGRESS NOTES
Cardiology    4/17/2023  2:51 PM    Problem list  Patient Active Problem List   Diagnosis    Nasal bones, closed fracture    Syncope and collapse    Palpitations    RBBB    PAF (paroxysmal atrial fibrillation)       CC:  F/u    HPI:  Patient is here for follow-up.  He has been having palpitation and cardiac event monitor showed atrial fibrillation.  His atrial fibrillation is usually short lived.  He was started on metoprolol.  He noticed that his palpitation occurs when he is drinking alcohol.  He also drinks 1 pot of coffee every morning.  He also reports over exerts himself exercising.    Medications  Current Outpatient Medications   Medication Sig Dispense Refill    benzonatate (TESSALON) 100 MG capsule Take 1 capsule (100 mg total) by mouth 3 (three) times daily as needed for Cough. 30 capsule 0    metoprolol tartrate (LOPRESSOR) 25 MG tablet Take 1 tablet (25 mg total) by mouth 2 (two) times daily as needed (palpitations). 60 tablet 11     No current facility-administered medications for this visit.      Prior to Admission medications    Medication Sig Start Date End Date Taking? Authorizing Provider   benzonatate (TESSALON) 100 MG capsule Take 1 capsule (100 mg total) by mouth 3 (three) times daily as needed for Cough. 4/17/23 4/27/23  Pedro Gonzales MD   metoprolol tartrate (LOPRESSOR) 25 MG tablet Take 1 tablet (25 mg total) by mouth 2 (two) times daily as needed (palpitations). 3/22/23 3/21/24  Pedro Gonzales MD         History  No past medical history on file.  Past Surgical History:   Procedure Laterality Date    RHINOPLASTY TIP       Social History     Socioeconomic History    Marital status: Single   Tobacco Use    Smoking status: Former     Packs/day: 1.00     Years: 15.00     Pack years: 15.00     Types: Cigarettes     Start date: 9/9/2002    Smokeless tobacco: Never   Substance and Sexual Activity    Alcohol use: Yes     Comment: socially    Drug use: Never         Allergies  Review of  patient's allergies indicates:  No Known Allergies      Review of Systems   Review of Systems   Constitutional: Negative for decreased appetite, fever and weight loss.   HENT:  Negative for congestion and nosebleeds.    Eyes:  Negative for double vision, vision loss in left eye, vision loss in right eye and visual disturbance.   Cardiovascular:  Positive for chest pain, palpitations and syncope. Negative for claudication, cyanosis, dyspnea on exertion, irregular heartbeat, leg swelling, near-syncope, orthopnea and paroxysmal nocturnal dyspnea.   Respiratory:  Negative for cough, hemoptysis, shortness of breath, sleep disturbances due to breathing, snoring, sputum production and wheezing.    Endocrine: Negative for cold intolerance and heat intolerance.   Skin:  Negative for nail changes and rash.   Musculoskeletal:  Negative for joint pain, muscle cramps, muscle weakness and myalgias.   Gastrointestinal:  Negative for change in bowel habit, heartburn, hematemesis, hematochezia, hemorrhoids and melena.   Neurological:  Negative for dizziness, focal weakness and headaches.       Physical Exam  Wt Readings from Last 1 Encounters:   04/17/23 72.5 kg (159 lb 14.4 oz)     BP Readings from Last 3 Encounters:   04/17/23 116/80   03/20/23 130/82   02/24/23 116/80     Pulse Readings from Last 1 Encounters:   04/17/23 73     Body mass index is 24.31 kg/m².    Physical Exam  Vitals reviewed.   Constitutional:       Appearance: He is well-developed.   HENT:      Head: Atraumatic.   Eyes:      General: No scleral icterus.  Neck:      Vascular: Normal carotid pulses. No carotid bruit, hepatojugular reflux or JVD.   Cardiovascular:      Rate and Rhythm: Normal rate and regular rhythm.      Chest Wall: PMI is not displaced.      Pulses: Intact distal pulses.           Carotid pulses are 2+ on the right side and 2+ on the left side.       Radial pulses are 2+ on the right side and 2+ on the left side.        Dorsalis pedis pulses are  2+ on the right side and 2+ on the left side.      Heart sounds: Normal heart sounds, S1 normal and S2 normal. No murmur heard.    No friction rub.   Pulmonary:      Effort: Pulmonary effort is normal. No respiratory distress.      Breath sounds: Normal breath sounds. No stridor. No wheezing or rales.   Chest:      Chest wall: No tenderness.   Abdominal:      General: Bowel sounds are normal.      Palpations: Abdomen is soft.   Musculoskeletal:      Cervical back: Neck supple. No edema.   Skin:     General: Skin is warm and dry.      Nails: There is no clubbing.   Neurological:      Mental Status: He is alert and oriented to person, place, and time.   Psychiatric:         Behavior: Behavior normal.         Thought Content: Thought content normal.           Assessment  1. RBBB  stable    2. Palpitations  Due to PAF    3. PAF (paroxysmal atrial fibrillation)  Symptomatic, on metoprolol, [id number] = 0        Plan and Discussion  Recommend to continue metoprolol, increase oral hydration, avoid alcohol and minimize caffeine intake.  Discussed referral to EP and he agrees.    Follow Up  2-3 months      Pedro Gonzales MD, F.A.C.C, F.S.C.A.I.        30 minutes were spent in chart review, documentation and review of results, and evaluation, treatment, and counseling of patient on the same day of service.    Disclaimer: This document was created using voice recognition software ([employer]). Although it may be edited, this document may contain errors related to incorrect recognition of the spoken word. Please call the physician if clarification is needed.

## 2023-04-18 ENCOUNTER — PATIENT MESSAGE (OUTPATIENT)
Dept: ADMINISTRATIVE | Facility: OTHER | Age: 63
End: 2023-04-18
Payer: COMMERCIAL

## 2023-04-19 ENCOUNTER — TELEPHONE (OUTPATIENT)
Dept: ADMINISTRATIVE | Facility: OTHER | Age: 63
End: 2023-04-19
Payer: COMMERCIAL

## 2023-04-19 ENCOUNTER — TELEPHONE (OUTPATIENT)
Dept: ELECTROPHYSIOLOGY | Facility: CLINIC | Age: 63
End: 2023-04-19
Payer: COMMERCIAL

## 2023-04-19 NOTE — TELEPHONE ENCOUNTER
LM for patient to contact our scheduling office to discuss rescheduling cancelled appointment of 4-25-23 with CARDIAC ELECTROPHYSIOLOGY. Contact number provided Portal message will be sent.

## 2023-04-20 ENCOUNTER — TELEPHONE (OUTPATIENT)
Dept: ELECTROPHYSIOLOGY | Facility: CLINIC | Age: 63
End: 2023-04-20
Payer: COMMERCIAL

## 2023-05-19 ENCOUNTER — LAB VISIT (OUTPATIENT)
Dept: LAB | Facility: OTHER | Age: 63
End: 2023-05-19
Attending: INTERNAL MEDICINE
Payer: COMMERCIAL

## 2023-05-19 ENCOUNTER — OFFICE VISIT (OUTPATIENT)
Dept: CARDIOLOGY | Facility: CLINIC | Age: 63
End: 2023-05-19
Payer: COMMERCIAL

## 2023-05-19 ENCOUNTER — PATIENT MESSAGE (OUTPATIENT)
Dept: CARDIOLOGY | Facility: CLINIC | Age: 63
End: 2023-05-19
Payer: COMMERCIAL

## 2023-05-19 VITALS
BODY MASS INDEX: 24.05 KG/M2 | HEIGHT: 68 IN | DIASTOLIC BLOOD PRESSURE: 80 MMHG | SYSTOLIC BLOOD PRESSURE: 126 MMHG | WEIGHT: 158.69 LBS | OXYGEN SATURATION: 98 % | HEART RATE: 74 BPM

## 2023-05-19 DIAGNOSIS — R00.2 PALPITATIONS: ICD-10-CM

## 2023-05-19 DIAGNOSIS — I48.0 PAF (PAROXYSMAL ATRIAL FIBRILLATION): Primary | ICD-10-CM

## 2023-05-19 DIAGNOSIS — I25.9 CHEST PAIN DUE TO MYOCARDIAL ISCHEMIA, UNSPECIFIED ISCHEMIC CHEST PAIN TYPE: ICD-10-CM

## 2023-05-19 DIAGNOSIS — I20.89 OTHER FORMS OF ANGINA PECTORIS: ICD-10-CM

## 2023-05-19 DIAGNOSIS — I45.10 RBBB: ICD-10-CM

## 2023-05-19 LAB
ALBUMIN SERPL BCP-MCNC: 4.1 G/DL (ref 3.5–5.2)
ALP SERPL-CCNC: 68 U/L (ref 55–135)
ALT SERPL W/O P-5'-P-CCNC: 23 U/L (ref 10–44)
ANION GAP SERPL CALC-SCNC: 7 MMOL/L (ref 8–16)
AST SERPL-CCNC: 17 U/L (ref 10–40)
BILIRUB SERPL-MCNC: 0.6 MG/DL (ref 0.1–1)
BUN SERPL-MCNC: 19 MG/DL (ref 6–20)
CALCIUM SERPL-MCNC: 8.9 MG/DL (ref 8.7–10.5)
CHLORIDE SERPL-SCNC: 108 MMOL/L (ref 95–110)
CO2 SERPL-SCNC: 24 MMOL/L (ref 23–29)
CREAT SERPL-MCNC: 0.9 MG/DL (ref 0.5–1.4)
ERYTHROCYTE [DISTWIDTH] IN BLOOD BY AUTOMATED COUNT: 11.9 % (ref 11.5–14.5)
EST. GFR  (NO RACE VARIABLE): >60 ML/MIN/1.73 M^2
GLUCOSE SERPL-MCNC: 75 MG/DL (ref 70–110)
HCT VFR BLD AUTO: 45.7 % (ref 40–54)
HGB BLD-MCNC: 15.1 G/DL (ref 14–18)
MCH RBC QN AUTO: 29.6 PG (ref 27–31)
MCHC RBC AUTO-ENTMCNC: 33 G/DL (ref 32–36)
MCV RBC AUTO: 90 FL (ref 82–98)
PLATELET # BLD AUTO: 214 K/UL (ref 150–450)
PMV BLD AUTO: 9.2 FL (ref 9.2–12.9)
POTASSIUM SERPL-SCNC: 3.9 MMOL/L (ref 3.5–5.1)
PROT SERPL-MCNC: 7 G/DL (ref 6–8.4)
RBC # BLD AUTO: 5.1 M/UL (ref 4.6–6.2)
SODIUM SERPL-SCNC: 139 MMOL/L (ref 136–145)
WBC # BLD AUTO: 6.06 K/UL (ref 3.9–12.7)

## 2023-05-19 PROCEDURE — 93010 ELECTROCARDIOGRAM REPORT: CPT | Mod: S$GLB,,, | Performed by: INTERNAL MEDICINE

## 2023-05-19 PROCEDURE — 3008F BODY MASS INDEX DOCD: CPT | Mod: CPTII,S$GLB,, | Performed by: INTERNAL MEDICINE

## 2023-05-19 PROCEDURE — 1159F PR MEDICATION LIST DOCUMENTED IN MEDICAL RECORD: ICD-10-PCS | Mod: CPTII,S$GLB,, | Performed by: INTERNAL MEDICINE

## 2023-05-19 PROCEDURE — 3074F SYST BP LT 130 MM HG: CPT | Mod: CPTII,S$GLB,, | Performed by: INTERNAL MEDICINE

## 2023-05-19 PROCEDURE — 3079F DIAST BP 80-89 MM HG: CPT | Mod: CPTII,S$GLB,, | Performed by: INTERNAL MEDICINE

## 2023-05-19 PROCEDURE — 3008F PR BODY MASS INDEX (BMI) DOCUMENTED: ICD-10-PCS | Mod: CPTII,S$GLB,, | Performed by: INTERNAL MEDICINE

## 2023-05-19 PROCEDURE — 99214 OFFICE O/P EST MOD 30 MIN: CPT | Mod: S$GLB,,, | Performed by: INTERNAL MEDICINE

## 2023-05-19 PROCEDURE — 3074F PR MOST RECENT SYSTOLIC BLOOD PRESSURE < 130 MM HG: ICD-10-PCS | Mod: CPTII,S$GLB,, | Performed by: INTERNAL MEDICINE

## 2023-05-19 PROCEDURE — 36415 COLL VENOUS BLD VENIPUNCTURE: CPT | Performed by: INTERNAL MEDICINE

## 2023-05-19 PROCEDURE — 99999 PR PBB SHADOW E&M-EST. PATIENT-LVL III: ICD-10-PCS | Mod: PBBFAC,,, | Performed by: INTERNAL MEDICINE

## 2023-05-19 PROCEDURE — 93010 EKG 12-LEAD: ICD-10-PCS | Mod: S$GLB,,, | Performed by: INTERNAL MEDICINE

## 2023-05-19 PROCEDURE — 1160F PR REVIEW ALL MEDS BY PRESCRIBER/CLIN PHARMACIST DOCUMENTED: ICD-10-PCS | Mod: CPTII,S$GLB,, | Performed by: INTERNAL MEDICINE

## 2023-05-19 PROCEDURE — 1159F MED LIST DOCD IN RCRD: CPT | Mod: CPTII,S$GLB,, | Performed by: INTERNAL MEDICINE

## 2023-05-19 PROCEDURE — 80053 COMPREHEN METABOLIC PANEL: CPT | Performed by: INTERNAL MEDICINE

## 2023-05-19 PROCEDURE — 93005 ELECTROCARDIOGRAM TRACING: CPT

## 2023-05-19 PROCEDURE — 85027 COMPLETE CBC AUTOMATED: CPT | Performed by: INTERNAL MEDICINE

## 2023-05-19 PROCEDURE — 99214 PR OFFICE/OUTPT VISIT, EST, LEVL IV, 30-39 MIN: ICD-10-PCS | Mod: S$GLB,,, | Performed by: INTERNAL MEDICINE

## 2023-05-19 PROCEDURE — 1160F RVW MEDS BY RX/DR IN RCRD: CPT | Mod: CPTII,S$GLB,, | Performed by: INTERNAL MEDICINE

## 2023-05-19 PROCEDURE — 3079F PR MOST RECENT DIASTOLIC BLOOD PRESSURE 80-89 MM HG: ICD-10-PCS | Mod: CPTII,S$GLB,, | Performed by: INTERNAL MEDICINE

## 2023-05-19 PROCEDURE — 99999 PR PBB SHADOW E&M-EST. PATIENT-LVL III: CPT | Mod: PBBFAC,,, | Performed by: INTERNAL MEDICINE

## 2023-05-19 RX ORDER — NITROGLYCERIN 0.4 MG/1
0.4 TABLET SUBLINGUAL EVERY 5 MIN PRN
Qty: 30 TABLET | Refills: 3 | Status: ON HOLD | OUTPATIENT
Start: 2023-05-19 | End: 2023-05-25 | Stop reason: HOSPADM

## 2023-05-19 NOTE — PROGRESS NOTES
Cardiology    5/19/2023  1:24 PM    Problem list  Patient Active Problem List   Diagnosis    Nasal bones, closed fracture    Syncope and collapse    Palpitations    RBBB    PAF (paroxysmal atrial fibrillation)    Other forms of angina pectoris       CC:  CP    HPI:  Patient was given same-day appointment to evaluate chest pain.  Patient has sent the message this morning complaining of a dull ache in his chest which was worse when he was changing his bed sheets.  He is also reported the same chest pressure when he was walking into our office today from the garage.  He stated that he had to sit down and rest.  He denies any rest symptoms.  He also reported an episode 3 weeks ago during the night when he got up to use the restroom and felt chest pressure and feeling weak and near syncopal.  He did not check his blood pressure until the next morning and his systolic was 109.  He denies any bleeding.    Medications  Current Outpatient Medications   Medication Sig Dispense Refill    metoprolol tartrate (LOPRESSOR) 25 MG tablet Take 1 tablet (25 mg total) by mouth 2 (two) times daily as needed (palpitations). 60 tablet 11    nitroGLYCERIN (NITROSTAT) 0.4 MG SL tablet Place 1 tablet (0.4 mg total) under the tongue every 5 (five) minutes as needed for Chest pain. 30 tablet 3     No current facility-administered medications for this visit.      Prior to Admission medications    Medication Sig Start Date End Date Taking? Authorizing Provider   metoprolol tartrate (LOPRESSOR) 25 MG tablet Take 1 tablet (25 mg total) by mouth 2 (two) times daily as needed (palpitations). 3/22/23 3/21/24  Pedro Gonzales MD   nitroGLYCERIN (NITROSTAT) 0.4 MG SL tablet Place 1 tablet (0.4 mg total) under the tongue every 5 (five) minutes as needed for Chest pain. 5/19/23 5/18/24  Pedro Gonzales MD         History  No past medical history on file.  Past Surgical History:   Procedure Laterality Date    RHINOPLASTY TIP       Social History      Socioeconomic History    Marital status: Single   Tobacco Use    Smoking status: Former     Packs/day: 1.00     Years: 15.00     Pack years: 15.00     Types: Cigarettes     Start date: 9/9/2002    Smokeless tobacco: Never   Substance and Sexual Activity    Alcohol use: Yes     Comment: socially    Drug use: Never         Allergies  Review of patient's allergies indicates:  No Known Allergies      Review of Systems   Review of Systems   Constitutional: Negative for decreased appetite, fever and weight loss.   HENT:  Negative for congestion and nosebleeds.    Eyes:  Negative for double vision, vision loss in left eye, vision loss in right eye and visual disturbance.   Cardiovascular:  Positive for chest pain, palpitations and syncope. Negative for claudication, cyanosis, dyspnea on exertion, irregular heartbeat, leg swelling, near-syncope, orthopnea and paroxysmal nocturnal dyspnea.   Respiratory:  Negative for cough, hemoptysis, shortness of breath, sleep disturbances due to breathing, snoring, sputum production and wheezing.    Endocrine: Negative for cold intolerance and heat intolerance.   Skin:  Negative for nail changes and rash.   Musculoskeletal:  Negative for joint pain, muscle cramps, muscle weakness and myalgias.   Gastrointestinal:  Negative for change in bowel habit, heartburn, hematemesis, hematochezia, hemorrhoids and melena.   Neurological:  Negative for dizziness, focal weakness and headaches.       Physical Exam  Wt Readings from Last 1 Encounters:   05/19/23 72 kg (158 lb 11.2 oz)     BP Readings from Last 3 Encounters:   05/19/23 126/80   04/17/23 116/80   03/20/23 130/82     Pulse Readings from Last 1 Encounters:   05/19/23 74     Body mass index is 24.13 kg/m².    Physical Exam  Vitals reviewed.   Constitutional:       Appearance: He is well-developed.   HENT:      Head: Atraumatic.   Eyes:      General: No scleral icterus.  Neck:      Vascular: Normal carotid pulses. No carotid bruit,  hepatojugular reflux or JVD.   Cardiovascular:      Rate and Rhythm: Normal rate and regular rhythm.      Chest Wall: PMI is not displaced.      Pulses: Intact distal pulses.           Carotid pulses are 2+ on the right side and 2+ on the left side.       Radial pulses are 2+ on the right side and 2+ on the left side.        Dorsalis pedis pulses are 2+ on the right side and 2+ on the left side.      Heart sounds: Normal heart sounds, S1 normal and S2 normal. No murmur heard.    No friction rub.   Pulmonary:      Effort: Pulmonary effort is normal. No respiratory distress.      Breath sounds: Normal breath sounds. No stridor. No wheezing or rales.   Chest:      Chest wall: No tenderness.   Abdominal:      General: Bowel sounds are normal.      Palpations: Abdomen is soft.   Musculoskeletal:      Cervical back: Neck supple. No edema.   Skin:     General: Skin is warm and dry.      Nails: There is no clubbing.   Neurological:      Mental Status: He is alert and oriented to person, place, and time.   Psychiatric:         Behavior: Behavior normal.         Thought Content: Thought content normal.           Assessment  1. PAF (paroxysmal atrial fibrillation)  In sinus rhythm    2. RBBB  Unchanged    3. Palpitations  Stable    4. Chest pain due to myocardial ischemia, unspecified ischemic chest pain type  Being evaluated  - EKG 12-lead  - CBC Without Differential; Future  - Comprehensive Metabolic Panel; Future    5. Other forms of angina pectoris  Being evaluated        Plan and Discussion  Discussed his EKG today showed normal sinus rhythm rate of 71 with complete right bundle-branch block.  Discussed are concerned that he has unstable angina given his midsternal dull ache and heaviness that comes on with exertion.  Explained that even though he had a negative treadmill stress test 2 months ago, it is possible that he may have a blockages or that the stress test may be a false negative.  The risks, benefits, indications  and alternatives of the planned procedure were discussed in details.  Discussed with patient the risks and benefits of the procedure including, but not limited to, the following; 1:1000 risk of heart attack, stroke and death with 3-5% risk of bleeding, vessel damage (in the arms, legs or in the heart), and the need for emergent surgery, including open heart surgery.  All questions were answered.  The patient agreed to proceed with coronary angiogram 5/29/23 at 8:00 am via R radial access.  Start ASA 81mg qd, NTG prn CP.  If Cp is not relieved with second dose of NTG, he was instructed to call 911.  No strenuous activity until further notice.       Follow Up  1 month      Pedro Gonzales MD, F.A.C.C, F.S.C.A.I.        40 minutes were spent in chart review, documentation and review of results, and evaluation, treatment, and counseling of patient on the same day of service.    Disclaimer: This document was created using voice recognition software ([employer]). Although it may be edited, this document may contain errors related to incorrect recognition of the spoken word. Please call the physician if clarification is needed.

## 2023-05-19 NOTE — H&P (VIEW-ONLY)
Cardiology    5/19/2023  1:24 PM    Problem list  Patient Active Problem List   Diagnosis    Nasal bones, closed fracture    Syncope and collapse    Palpitations    RBBB    PAF (paroxysmal atrial fibrillation)    Other forms of angina pectoris       CC:  CP    HPI:  Patient was given same-day appointment to evaluate chest pain.  Patient has sent the message this morning complaining of a dull ache in his chest which was worse when he was changing his bed sheets.  He is also reported the same chest pressure when he was walking into our office today from the garage.  He stated that he had to sit down and rest.  He denies any rest symptoms.  He also reported an episode 3 weeks ago during the night when he got up to use the restroom and felt chest pressure and feeling weak and near syncopal.  He did not check his blood pressure until the next morning and his systolic was 109.  He denies any bleeding.    Medications  Current Outpatient Medications   Medication Sig Dispense Refill    metoprolol tartrate (LOPRESSOR) 25 MG tablet Take 1 tablet (25 mg total) by mouth 2 (two) times daily as needed (palpitations). 60 tablet 11    nitroGLYCERIN (NITROSTAT) 0.4 MG SL tablet Place 1 tablet (0.4 mg total) under the tongue every 5 (five) minutes as needed for Chest pain. 30 tablet 3     No current facility-administered medications for this visit.      Prior to Admission medications    Medication Sig Start Date End Date Taking? Authorizing Provider   metoprolol tartrate (LOPRESSOR) 25 MG tablet Take 1 tablet (25 mg total) by mouth 2 (two) times daily as needed (palpitations). 3/22/23 3/21/24  Pedro Gonzales MD   nitroGLYCERIN (NITROSTAT) 0.4 MG SL tablet Place 1 tablet (0.4 mg total) under the tongue every 5 (five) minutes as needed for Chest pain. 5/19/23 5/18/24  Pedro Gonzales MD         History  No past medical history on file.  Past Surgical History:   Procedure Laterality Date    RHINOPLASTY TIP       Social History      Socioeconomic History    Marital status: Single   Tobacco Use    Smoking status: Former     Packs/day: 1.00     Years: 15.00     Pack years: 15.00     Types: Cigarettes     Start date: 9/9/2002    Smokeless tobacco: Never   Substance and Sexual Activity    Alcohol use: Yes     Comment: socially    Drug use: Never         Allergies  Review of patient's allergies indicates:  No Known Allergies      Review of Systems   Review of Systems   Constitutional: Negative for decreased appetite, fever and weight loss.   HENT:  Negative for congestion and nosebleeds.    Eyes:  Negative for double vision, vision loss in left eye, vision loss in right eye and visual disturbance.   Cardiovascular:  Positive for chest pain, palpitations and syncope. Negative for claudication, cyanosis, dyspnea on exertion, irregular heartbeat, leg swelling, near-syncope, orthopnea and paroxysmal nocturnal dyspnea.   Respiratory:  Negative for cough, hemoptysis, shortness of breath, sleep disturbances due to breathing, snoring, sputum production and wheezing.    Endocrine: Negative for cold intolerance and heat intolerance.   Skin:  Negative for nail changes and rash.   Musculoskeletal:  Negative for joint pain, muscle cramps, muscle weakness and myalgias.   Gastrointestinal:  Negative for change in bowel habit, heartburn, hematemesis, hematochezia, hemorrhoids and melena.   Neurological:  Negative for dizziness, focal weakness and headaches.       Physical Exam  Wt Readings from Last 1 Encounters:   05/19/23 72 kg (158 lb 11.2 oz)     BP Readings from Last 3 Encounters:   05/19/23 126/80   04/17/23 116/80   03/20/23 130/82     Pulse Readings from Last 1 Encounters:   05/19/23 74     Body mass index is 24.13 kg/m².    Physical Exam  Vitals reviewed.   Constitutional:       Appearance: He is well-developed.   HENT:      Head: Atraumatic.   Eyes:      General: No scleral icterus.  Neck:      Vascular: Normal carotid pulses. No carotid bruit,  hepatojugular reflux or JVD.   Cardiovascular:      Rate and Rhythm: Normal rate and regular rhythm.      Chest Wall: PMI is not displaced.      Pulses: Intact distal pulses.           Carotid pulses are 2+ on the right side and 2+ on the left side.       Radial pulses are 2+ on the right side and 2+ on the left side.        Dorsalis pedis pulses are 2+ on the right side and 2+ on the left side.      Heart sounds: Normal heart sounds, S1 normal and S2 normal. No murmur heard.    No friction rub.   Pulmonary:      Effort: Pulmonary effort is normal. No respiratory distress.      Breath sounds: Normal breath sounds. No stridor. No wheezing or rales.   Chest:      Chest wall: No tenderness.   Abdominal:      General: Bowel sounds are normal.      Palpations: Abdomen is soft.   Musculoskeletal:      Cervical back: Neck supple. No edema.   Skin:     General: Skin is warm and dry.      Nails: There is no clubbing.   Neurological:      Mental Status: He is alert and oriented to person, place, and time.   Psychiatric:         Behavior: Behavior normal.         Thought Content: Thought content normal.           Assessment  1. PAF (paroxysmal atrial fibrillation)  In sinus rhythm    2. RBBB  Unchanged    3. Palpitations  Stable    4. Chest pain due to myocardial ischemia, unspecified ischemic chest pain type  Being evaluated  - EKG 12-lead  - CBC Without Differential; Future  - Comprehensive Metabolic Panel; Future    5. Other forms of angina pectoris  Being evaluated        Plan and Discussion  Discussed his EKG today showed normal sinus rhythm rate of 71 with complete right bundle-branch block.  Discussed are concerned that he has unstable angina given his midsternal dull ache and heaviness that comes on with exertion.  Explained that even though he had a negative treadmill stress test 2 months ago, it is possible that he may have a blockages or that the stress test may be a false negative.  The risks, benefits, indications  and alternatives of the planned procedure were discussed in details.  Discussed with patient the risks and benefits of the procedure including, but not limited to, the following; 1:1000 risk of heart attack, stroke and death with 3-5% risk of bleeding, vessel damage (in the arms, legs or in the heart), and the need for emergent surgery, including open heart surgery.  All questions were answered.  The patient agreed to proceed with coronary angiogram 5/29/23 at 8:00 am via R radial access.  Start ASA 81mg qd, NTG prn CP.  If Cp is not relieved with second dose of NTG, he was instructed to call 911.  No strenuous activity until further notice.       Follow Up  1 month      Pedro Gonzales MD, F.A.C.C, F.S.C.A.I.        40 minutes were spent in chart review, documentation and review of results, and evaluation, treatment, and counseling of patient on the same day of service.    Disclaimer: This document was created using voice recognition software ([employer]). Although it may be edited, this document may contain errors related to incorrect recognition of the spoken word. Please call the physician if clarification is needed.

## 2023-05-19 NOTE — PATIENT INSTRUCTIONS
Procedure: Coronary Angiogram  Procedure date: May 29, 2023  Procedure time: 7am    Arrive at the Outpatient Surgery Unit ([facility]) at 6am.  Nothing to eat or drink after midnight.  Please make arrangements for a family member or friend to bring you home after the procedure. You will not be able to drive home.      If you have any questions, please call our office at [phone].

## 2023-05-19 NOTE — TELEPHONE ENCOUNTER
Spoke to pt. Scheduled appt for pt to see Dr. Gonzales this afternoon. Pt verbalized understanding.

## 2023-05-23 ENCOUNTER — PATIENT MESSAGE (OUTPATIENT)
Dept: CARDIOLOGY | Facility: OTHER | Age: 63
End: 2023-05-23
Payer: COMMERCIAL

## 2023-05-25 ENCOUNTER — HOSPITAL ENCOUNTER (OUTPATIENT)
Facility: OTHER | Age: 63
Discharge: HOME | End: 2023-05-25
Attending: EMERGENCY MEDICINE | Admitting: EMERGENCY MEDICINE
Payer: COMMERCIAL

## 2023-05-25 ENCOUNTER — PATIENT OUTREACH (OUTPATIENT)
Dept: ADMINISTRATIVE | Facility: HOSPITAL | Age: 63
End: 2023-05-25
Payer: COMMERCIAL

## 2023-05-25 VITALS
HEART RATE: 66 BPM | TEMPERATURE: 98 F | OXYGEN SATURATION: 99 % | DIASTOLIC BLOOD PRESSURE: 68 MMHG | SYSTOLIC BLOOD PRESSURE: 122 MMHG | RESPIRATION RATE: 18 BRPM

## 2023-05-25 DIAGNOSIS — I45.10 RBBB: ICD-10-CM

## 2023-05-25 DIAGNOSIS — R07.9 CHEST PAIN: ICD-10-CM

## 2023-05-25 DIAGNOSIS — I48.0 PAF (PAROXYSMAL ATRIAL FIBRILLATION): ICD-10-CM

## 2023-05-25 DIAGNOSIS — I25.9 CHEST PAIN DUE TO MYOCARDIAL ISCHEMIA, UNSPECIFIED ISCHEMIC CHEST PAIN TYPE: ICD-10-CM

## 2023-05-25 DIAGNOSIS — I20.89 OTHER FORMS OF ANGINA PECTORIS: ICD-10-CM

## 2023-05-25 LAB
ALBUMIN SERPL BCP-MCNC: 4.2 G/DL (ref 3.5–5.2)
ALP SERPL-CCNC: 72 U/L (ref 55–135)
ALT SERPL W/O P-5'-P-CCNC: 21 U/L (ref 10–44)
ANION GAP SERPL CALC-SCNC: 8 MMOL/L (ref 8–16)
AST SERPL-CCNC: 17 U/L (ref 10–40)
BASOPHILS # BLD AUTO: 0.06 K/UL (ref 0–0.2)
BASOPHILS NFR BLD: 1.2 % (ref 0–1.9)
BILIRUB SERPL-MCNC: 0.7 MG/DL (ref 0.1–1)
BNP SERPL-MCNC: <10 PG/ML (ref 0–99)
BUN SERPL-MCNC: 15 MG/DL (ref 6–20)
CALCIUM SERPL-MCNC: 9.3 MG/DL (ref 8.7–10.5)
CHLORIDE SERPL-SCNC: 108 MMOL/L (ref 95–110)
CO2 SERPL-SCNC: 23 MMOL/L (ref 23–29)
CREAT SERPL-MCNC: 0.9 MG/DL (ref 0.5–1.4)
D DIMER PPP IA.FEU-MCNC: <0.19 MG/L FEU
DIFFERENTIAL METHOD: ABNORMAL
EOSINOPHIL # BLD AUTO: 0.1 K/UL (ref 0–0.5)
EOSINOPHIL NFR BLD: 2.6 % (ref 0–8)
ERYTHROCYTE [DISTWIDTH] IN BLOOD BY AUTOMATED COUNT: 12.2 % (ref 11.5–14.5)
EST. GFR  (NO RACE VARIABLE): >60 ML/MIN/1.73 M^2
GLUCOSE SERPL-MCNC: 101 MG/DL (ref 70–110)
HCT VFR BLD AUTO: 45.1 % (ref 40–54)
HGB BLD-MCNC: 15.4 G/DL (ref 14–18)
IMM GRANULOCYTES # BLD AUTO: 0.01 K/UL (ref 0–0.04)
IMM GRANULOCYTES NFR BLD AUTO: 0.2 % (ref 0–0.5)
LYMPHOCYTES # BLD AUTO: 1.7 K/UL (ref 1–4.8)
LYMPHOCYTES NFR BLD: 34.3 % (ref 18–48)
MCH RBC QN AUTO: 30.6 PG (ref 27–31)
MCHC RBC AUTO-ENTMCNC: 34.1 G/DL (ref 32–36)
MCV RBC AUTO: 90 FL (ref 82–98)
MONOCYTES # BLD AUTO: 0.4 K/UL (ref 0.3–1)
MONOCYTES NFR BLD: 7.6 % (ref 4–15)
NEUTROPHILS # BLD AUTO: 2.7 K/UL (ref 1.8–7.7)
NEUTROPHILS NFR BLD: 54.1 % (ref 38–73)
NRBC BLD-RTO: 0 /100 WBC
PLATELET # BLD AUTO: 187 K/UL (ref 150–450)
PMV BLD AUTO: 9 FL (ref 9.2–12.9)
POTASSIUM SERPL-SCNC: 4.4 MMOL/L (ref 3.5–5.1)
PROT SERPL-MCNC: 6.8 G/DL (ref 6–8.4)
RBC # BLD AUTO: 5.04 M/UL (ref 4.6–6.2)
SODIUM SERPL-SCNC: 139 MMOL/L (ref 136–145)
TROPONIN I SERPL DL<=0.01 NG/ML-MCNC: <0.006 NG/ML (ref 0–0.03)
WBC # BLD AUTO: 4.98 K/UL (ref 3.9–12.7)

## 2023-05-25 PROCEDURE — 93010 ELECTROCARDIOGRAM REPORT: CPT | Mod: ,,, | Performed by: INTERNAL MEDICINE

## 2023-05-25 PROCEDURE — 25000003 PHARM REV CODE 250: Performed by: INTERNAL MEDICINE

## 2023-05-25 PROCEDURE — G0378 HOSPITAL OBSERVATION PER HR: HCPCS

## 2023-05-25 PROCEDURE — 96361 HYDRATE IV INFUSION ADD-ON: CPT | Mod: 59

## 2023-05-25 PROCEDURE — 84484 ASSAY OF TROPONIN QUANT: CPT | Performed by: NURSE PRACTITIONER

## 2023-05-25 PROCEDURE — 93005 ELECTROCARDIOGRAM TRACING: CPT | Mod: 59

## 2023-05-25 PROCEDURE — 85025 COMPLETE CBC W/AUTO DIFF WBC: CPT | Performed by: NURSE PRACTITIONER

## 2023-05-25 PROCEDURE — C1887 CATHETER, GUIDING: HCPCS | Performed by: INTERNAL MEDICINE

## 2023-05-25 PROCEDURE — C1769 GUIDE WIRE: HCPCS | Performed by: INTERNAL MEDICINE

## 2023-05-25 PROCEDURE — 93454 CORONARY ARTERY ANGIO S&I: CPT | Mod: 26,,, | Performed by: INTERNAL MEDICINE

## 2023-05-25 PROCEDURE — 63600175 PHARM REV CODE 636 W HCPCS: Performed by: INTERNAL MEDICINE

## 2023-05-25 PROCEDURE — 96374 THER/PROPH/DIAG INJ IV PUSH: CPT | Mod: 59

## 2023-05-25 PROCEDURE — 83880 ASSAY OF NATRIURETIC PEPTIDE: CPT | Performed by: NURSE PRACTITIONER

## 2023-05-25 PROCEDURE — 25500020 PHARM REV CODE 255: Performed by: INTERNAL MEDICINE

## 2023-05-25 PROCEDURE — 99222 1ST HOSP IP/OBS MODERATE 55: CPT | Mod: ,,, | Performed by: INTERNAL MEDICINE

## 2023-05-25 PROCEDURE — 93010 EKG 12-LEAD: ICD-10-PCS | Mod: ,,, | Performed by: INTERNAL MEDICINE

## 2023-05-25 PROCEDURE — 25000003 PHARM REV CODE 250: Performed by: NURSE PRACTITIONER

## 2023-05-25 PROCEDURE — C1894 INTRO/SHEATH, NON-LASER: HCPCS | Performed by: INTERNAL MEDICINE

## 2023-05-25 PROCEDURE — 93454 CORONARY ARTERY ANGIO S&I: CPT | Performed by: INTERNAL MEDICINE

## 2023-05-25 PROCEDURE — 63600175 PHARM REV CODE 636 W HCPCS: Performed by: NURSE PRACTITIONER

## 2023-05-25 PROCEDURE — 80053 COMPREHEN METABOLIC PANEL: CPT | Performed by: NURSE PRACTITIONER

## 2023-05-25 PROCEDURE — 85379 FIBRIN DEGRADATION QUANT: CPT | Performed by: NURSE PRACTITIONER

## 2023-05-25 PROCEDURE — 99222 PR INITIAL HOSPITAL CARE,LEVL II: ICD-10-PCS | Mod: ,,, | Performed by: INTERNAL MEDICINE

## 2023-05-25 PROCEDURE — 99285 EMERGENCY DEPT VISIT HI MDM: CPT | Mod: 25

## 2023-05-25 PROCEDURE — 93454 PR CATH PLACE/CORONARY ANGIO, IMG SUPER/INTERP: ICD-10-PCS | Mod: 26,,, | Performed by: INTERNAL MEDICINE

## 2023-05-25 RX ORDER — MIDAZOLAM HYDROCHLORIDE 1 MG/ML
INJECTION INTRAMUSCULAR; INTRAVENOUS
Status: DISCONTINUED | OUTPATIENT
Start: 2023-05-25 | End: 2023-05-25 | Stop reason: HOSPADM

## 2023-05-25 RX ORDER — MAG HYDROX/ALUMINUM HYD/SIMETH 200-200-20
30 SUSPENSION, ORAL (FINAL DOSE FORM) ORAL ONCE
Status: COMPLETED | OUTPATIENT
Start: 2023-05-25 | End: 2023-05-25

## 2023-05-25 RX ORDER — SODIUM CHLORIDE 9 MG/ML
INJECTION, SOLUTION INTRAVENOUS CONTINUOUS
Status: DISCONTINUED | OUTPATIENT
Start: 2023-05-25 | End: 2023-05-25 | Stop reason: HOSPADM

## 2023-05-25 RX ORDER — DIPHENHYDRAMINE HCL 25 MG
25 CAPSULE ORAL
Status: DISCONTINUED | OUTPATIENT
Start: 2023-05-25 | End: 2023-05-25 | Stop reason: HOSPADM

## 2023-05-25 RX ORDER — LIDOCAINE HYDROCHLORIDE 10 MG/ML
INJECTION, SOLUTION EPIDURAL; INFILTRATION; INTRACAUDAL; PERINEURAL
Status: DISCONTINUED | OUTPATIENT
Start: 2023-05-25 | End: 2023-05-25 | Stop reason: HOSPADM

## 2023-05-25 RX ORDER — ONDANSETRON 8 MG/1
8 TABLET, ORALLY DISINTEGRATING ORAL EVERY 8 HOURS PRN
Status: DISCONTINUED | OUTPATIENT
Start: 2023-05-25 | End: 2023-05-25 | Stop reason: HOSPADM

## 2023-05-25 RX ORDER — FENTANYL CITRATE 50 UG/ML
INJECTION, SOLUTION INTRAMUSCULAR; INTRAVENOUS
Status: DISCONTINUED | OUTPATIENT
Start: 2023-05-25 | End: 2023-05-25 | Stop reason: HOSPADM

## 2023-05-25 RX ORDER — TALC
6 POWDER (GRAM) TOPICAL NIGHTLY PRN
Status: DISCONTINUED | OUTPATIENT
Start: 2023-05-25 | End: 2023-05-25 | Stop reason: HOSPADM

## 2023-05-25 RX ORDER — KETOROLAC TROMETHAMINE 30 MG/ML
15 INJECTION, SOLUTION INTRAMUSCULAR; INTRAVENOUS
Status: COMPLETED | OUTPATIENT
Start: 2023-05-25 | End: 2023-05-25

## 2023-05-25 RX ORDER — SODIUM CHLORIDE 0.9 % (FLUSH) 0.9 %
10 SYRINGE (ML) INJECTION
Status: DISCONTINUED | OUTPATIENT
Start: 2023-05-25 | End: 2023-05-25 | Stop reason: HOSPADM

## 2023-05-25 RX ORDER — HEPARIN SODIUM 1000 [USP'U]/ML
INJECTION, SOLUTION INTRAVENOUS; SUBCUTANEOUS
Status: DISCONTINUED | OUTPATIENT
Start: 2023-05-25 | End: 2023-05-25 | Stop reason: HOSPADM

## 2023-05-25 RX ORDER — ACETAMINOPHEN 325 MG/1
650 TABLET ORAL EVERY 4 HOURS PRN
Status: DISCONTINUED | OUTPATIENT
Start: 2023-05-25 | End: 2023-05-25 | Stop reason: HOSPADM

## 2023-05-25 RX ORDER — LIDOCAINE HYDROCHLORIDE 20 MG/ML
15 SOLUTION OROPHARYNGEAL ONCE
Status: COMPLETED | OUTPATIENT
Start: 2023-05-25 | End: 2023-05-25

## 2023-05-25 RX ORDER — NAPROXEN SODIUM 220 MG/1
162 TABLET, FILM COATED ORAL
Status: COMPLETED | OUTPATIENT
Start: 2023-05-25 | End: 2023-05-25

## 2023-05-25 RX ORDER — FAMOTIDINE 20 MG/1
40 TABLET, FILM COATED ORAL
Status: COMPLETED | OUTPATIENT
Start: 2023-05-25 | End: 2023-05-25

## 2023-05-25 RX ADMIN — SODIUM CHLORIDE: 9 INJECTION, SOLUTION INTRAVENOUS at 12:05

## 2023-05-25 RX ADMIN — ASPIRIN 81 MG CHEWABLE TABLET 162 MG: 81 TABLET CHEWABLE at 10:05

## 2023-05-25 RX ADMIN — DIPHENHYDRAMINE HYDROCHLORIDE 25 MG: 25 CAPSULE ORAL at 12:05

## 2023-05-25 RX ADMIN — LIDOCAINE HYDROCHLORIDE 15 ML: 20 SOLUTION ORAL at 10:05

## 2023-05-25 RX ADMIN — FAMOTIDINE 40 MG: 20 TABLET, FILM COATED ORAL at 10:05

## 2023-05-25 RX ADMIN — KETOROLAC TROMETHAMINE 15 MG: 30 INJECTION, SOLUTION INTRAMUSCULAR; INTRAVENOUS at 10:05

## 2023-05-25 RX ADMIN — ALUMINUM HYDROXIDE, MAGNESIUM HYDROXIDE, AND SIMETHICONE 30 ML: 200; 200; 20 SUSPENSION ORAL at 10:05

## 2023-05-25 NOTE — ED NOTES
Spoke with cath lab nurse, pt to be taken for case today. Cath lab nurse instructed to give benadryl now for pre procedure. Will maintain NPO and prepare for cath lab.

## 2023-05-25 NOTE — DISCHARGE SUMMARY
[facility] - Cath Lab ([place])  Discharge Note  Short Stay    Procedure(s) (LRB):  ANGIOGRAM, CORONARY ARTERY (N/A)      OUTCOME: Patient tolerated treatment/procedure well without complication and is now ready for discharge.    DISPOSITION: Home or Self Care    FINAL DIAGNOSIS:  Other forms of angina pectoris    FOLLOWUP: In clinic    DISCHARGE INSTRUCTIONS:    Discharge Procedure Orders   Diet general        TIME SPENT ON DISCHARGE: 15 minutes

## 2023-05-25 NOTE — H&P
[facility] ED Observation Unit  History and Physical      I assumed care of this patient from the Emergency Department at onset of observation time, 11:36 on 05/25/2023.       History of Present Illness:  Ten Parr is a 62 y.o. male with past medical history of paroxysmal AFib, RBBB presenting with intermittent dull midsternal chest pain.  He states that this has been ongoing for the past few months.  Describes the pain as a dull burning ache in his chest.  This is substernal and does not radiate. When this initially happened a few months ago it was also associated with near-syncope and severe pain.  He is had a few episodes since then dull chest pain and near-syncope.  He denies syncope and dizziness with this particular episode of chest pain.  He also states it is not particularly painful.  He does report a small amount exertional dyspnea.  He states he feels winded when going up the stairs.  Not short of breath at rest.  He states that he took 3 nitro at home without relief.  He also took 2 baby aspirins.  He states that he does have GERD for which she takes Pepcid as needed however with changes in his diet his symptoms have been well-controlled.  He states this does not feel like GERD.  Denies recent injury or trauma.  His cardiologist is Dr. Gonzales.  He was seen and evaluated in office 05/19/2023 for the same complaint.  Angiogram was plan for 05/29/2023.  No fever, chills, URI symptoms, abdominal pain, nausea or vomiting.     ED Course:  EKG without ischemic changes.  Normal CXR.  Labwork WNL.  Troponin WNL.  Patient was treated with GI cocktail and Toradol to suggest may be GI versus MSK source but had no relief.  Discussed with patient's cardiologist Dr. Gonzales who is concern for unstable angina and plan to take patient to cath lab today.    I reviewed the ED Provider Note dated 05/25/2023   prior to my evaluation of this patient.  I reviewed all labs and imaging performed in the Main ED, prior to  patient being placed in Observation. Patient was placed in the ED Observation Unit for chest pain    PMHx   No past medical history on file.   Past Surgical History:   Procedure Laterality Date    NASAL SEPTUM SURGERY      RHINOPLASTY TIP          Family Hx   Family History   Problem Relation Age of Onset    Alzheimer's disease Mother     Heart disease Father     Dementia Father     Heart attack Brother     Melanoma Neg Hx         Social Hx   Social History     Socioeconomic History    Marital status: Single   Tobacco Use    Smoking status: Former     Packs/day: 1.00     Years: 15.00     Pack years: 15.00     Types: Cigarettes     Start date: 9/9/2002    Smokeless tobacco: Never   Substance and Sexual Activity    Alcohol use: Yes     Comment: socially    Drug use: Never        Vital Signs   Vitals:    05/25/23 0953 05/25/23 1300   BP: 115/73 115/67   Pulse: 67 65   Resp: 20 18   Temp: 98.9 °F (37.2 °C)    TempSrc: Oral    SpO2: 100% 99%       Review of Systems  Review of Systems   Constitutional:  Negative for chills, fever and malaise/fatigue.   Respiratory:  Negative for cough and shortness of breath.    Cardiovascular:  Positive for chest pain. Negative for palpitations.   Gastrointestinal:  Negative for abdominal pain, nausea and vomiting.   Genitourinary:  Negative for dysuria.   Musculoskeletal:  Negative for back pain and myalgias.   Skin:  Negative for rash.   Neurological:  Negative for dizziness and headaches.   Psychiatric/Behavioral:  Negative for depression and suicidal ideas.      Brief Physical Exam/Reassessment   Physical Exam    Constitutional: He appears well-developed and well-nourished. He is cooperative.  Non-toxic appearance. No distress.   HENT:   Head: Normocephalic and atraumatic.   Eyes: EOM are normal. Pupils are equal, round, and reactive to light. No scleral icterus.   Neck:   Normal range of motion.  Cardiovascular:  Normal rate, regular rhythm and normal heart sounds.            Pulmonary/Chest: Breath sounds normal. No respiratory distress. He exhibits no tenderness.   Abdominal: Abdomen is soft. Bowel sounds are normal. He exhibits no distension. There is no abdominal tenderness.   Musculoskeletal:         General: No tenderness. Normal range of motion.      Cervical back: Normal range of motion.     Neurological: He is alert and oriented to person, place, and time. He has normal strength. No sensory deficit. GCS score is 15. GCS eye subscore is 4. GCS verbal subscore is 5. GCS motor subscore is 6.   Skin: Skin is warm and dry. Capillary refill takes less than 2 seconds. No erythema.   Psychiatric: He has a normal mood and affect. Thought content normal.       Labs Reviewed   CBC W/ AUTO DIFFERENTIAL - Abnormal; Notable for the following components:       Result Value    MPV 9.0 (*)     All other components within normal limits   COMPREHENSIVE METABOLIC PANEL   TROPONIN I   B-TYPE NATRIURETIC PEPTIDE   D DIMER, QUANTITATIVE   TROPONIN I     X-Ray Chest AP Portable   Final Result      No acute process seen.         Electronically signed by: Riki Marley MD   Date:    05/25/2023   Time:    10:50            Medications:   Scheduled Meds:  Continuous Infusions:   sodium chloride 0.9% 100 mL/hr at 05/25/23 1207    sodium chloride 0.9%       PRN Meds:.acetaminophen, diphenhydrAMINE, fentaNYL, heparin (porcine), iohexoL, LIDOcaine (PF) 10 mg/ml (1%), melatonin, midazolam, ondansetron, sodium chloride 0.9%, verapamil-nitroGLYCERIN 2.5-0.2 mg in NS 10 mL      Assessment/Plan:    1. Chest pain    2. PAF (paroxysmal atrial fibrillation)    3. RBBB    4. Chest pain due to myocardial ischemia, unspecified ischemic chest pain type    5. Other forms of angina pectoris      -Angiogram today    Case was discussed with the ED provider, Dr. Leonardo.

## 2023-05-25 NOTE — ED PROVIDER NOTES
Source of History:  Patient    Chief complaint:  Chest Pain (Pt reports intermittent, midsternal chest pain x 1 week with SOB on exertion. Pt states he was seen by cardiology last week and given rx for nitro, pt states that he took nitro x 3 today but still felt the pain )      HPI:  Ten Parr is a 62 y.o. male with past medical history of paroxysmal AFib, RBBB presenting with intermittent dull midsternal chest pain.  He states that this has been ongoing for the past few months.  Describes the pain as a dull burning ache in his chest.  This is substernal and does not radiate. When this initially happened a few months ago it was also associated with near-syncope and severe pain.  He is had a few episodes since then dull chest pain and near-syncope.  He denies syncope and dizziness with this particular episode of chest pain.  He also states it is not particularly painful.  He does report a small amount exertional dyspnea.  He states he feels winded when going up the stairs.  Not short of breath at rest.  He states that he took 3 nitro at home without relief.  He also took 2 baby aspirins.  He states that he does have GERD for which she takes Pepcid as needed however with changes in his diet his symptoms have been well-controlled.  He states this does not feel like GERD.  Denies recent injury or trauma.  His cardiologist is Dr. Gonzales.  He was seen and evaluated in office 05/19/2023 for the same complaint.  Angiogram was plan for 05/29/2023.  No fever, chills, URI symptoms, abdominal pain, nausea or vomiting.    This is the extent to the patients complaints today here in the emergency department.    ROS: As per HPI and below:  HPI    Review of patient's allergies indicates:  No Known Allergies    PMH:  As per HPI and below:  No past medical history on file.  Past Surgical History:   Procedure Laterality Date    NASAL SEPTUM SURGERY      RHINOPLASTY TIP         Social History     Tobacco Use    Smoking status:  Former     Packs/day: 1.00     Years: 15.00     Pack years: 15.00     Types: Cigarettes     Start date: 9/9/2002    Smokeless tobacco: Never   Substance Use Topics    Alcohol use: Yes     Comment: socially    Drug use: Never       Physical Exam:    /73   Pulse 67   Temp 98.9 °F (37.2 °C) (Oral)   Resp 20   SpO2 100%   Nursing note and vital signs reviewed.  Appearance: No acute distress.  Eyes: No conjunctival injection.  ENT: Oropharynx clear.    Chest/ Respiratory: Clear to auscultation bilaterally.  Good air movement.  No wheezes.  No rhonchi. No rales. No accessory muscle use.  Cardiovascular: Regular rate and rhythm.  No murmurs. No gallops. No rubs.  Abdomen: Soft.  Not distended.  Nontender.  No guarding.  No rebound. Non-peritoneal.  Musculoskeletal: Good range of motion all joints.  No deformities.  Neck supple.  No meningismus.  Skin: No rashes seen.  Good turgor.  No abrasions.  No ecchymoses.  Neurologic: Motor intact.  Sensation intact.  Cerebellar intact.  Cranial nerves intact.  Mental Status:  Alert and oriented x 3.  Appropriate, conversant    Labs that have been ordered have been independently reviewed and interpreted by myself.    I decided to obtain the patient's medical records.  Summary of Medical Records:  Plan and Discussion from clinic records 5/19/23  Discussed his EKG today showed normal sinus rhythm rate of 71 with complete right bundle-branch block.  Discussed are concerned that he has unstable angina given his midsternal dull ache and heaviness that comes on with exertion.  Explained that even though he had a negative treadmill stress test 2 months ago, it is possible that he may have a blockages or that the stress test may be a false negative.  The risks, benefits, indications and alternatives of the planned procedure were discussed in details.  Discussed with patient the risks and benefits of the procedure including, but not limited to, the following; 1:1000 risk of heart  attack, stroke and death with 3-5% risk of bleeding, vessel damage (in the arms, legs or in the heart), and the need for emergent surgery, including open heart surgery.  All questions were answered.  The patient agreed to proceed with coronary angiogram 5/29/23 at 8:00 am via R radial access.  Start ASA 81mg qd, NTG prn CP.  If Cp is not relieved with second dose of NTG, he was instructed to call 911.  No strenuous activity until further notice.        Labs Reviewed   CBC W/ AUTO DIFFERENTIAL - Abnormal; Notable for the following components:       Result Value    MPV 9.0 (*)     All other components within normal limits   COMPREHENSIVE METABOLIC PANEL   TROPONIN I   B-TYPE NATRIURETIC PEPTIDE   D DIMER, QUANTITATIVE   TROPONIN I       Imaging Results              X-Ray Chest AP Portable (Final result)  Result time 05/25/23 10:50:00      Final result by Riki Marley III, MD (05/25/23 10:50:00)                   Impression:      No acute process seen.      Electronically signed by: Riki Marley MD  Date:    05/25/2023  Time:    10:50               Narrative:    EXAMINATION:  XR CHEST AP PORTABLE    CLINICAL HISTORY:  Chest Pain;    FINDINGS:  Heart size is normal.  Lungs are clear and the bones are noncontributory.                                      Initial Impression/ Differential Dx:  Urgent evaluation of 62 y.o. male presenting with chest pain. Patient is afebrile, not toxic appearing and hemodynamically stable.  Concern for unstable angina after review clinic notes.  Plan for ACS workup.  Patient reports the pain as a burning pain.  States that has not felt burning before.  States it does not feel like GERD however will trial GI cocktail.  Will also trial Toradol to assess for MSK source.  After workup, will discuss with Dr. Janet cross cardiologist    Differential Diagnosis includes, but is not limited to:  ACS/MI, PE, aortic dissection, pneumothorax, cardiac tamponade, pericarditis/myocarditis,  pneumonia, infection/abscess, lung mass, trauma/fracture, costochondritis/pleurisy, MSK pain/contusion, GERD, biliary disease, pancreatitis, anemia      MDM:        ED Course as of 05/25/23 1214   Thu May 25, 2023   1054 EKG independently interpreted by myself shows normal sinus rhythm at a rate of 62 beats per minute, normal intervals, right bundle rhonda block, no STEMI.  Grossly unchanged from prior EKG from May 19, 2023 [CU]   1055 CBC auto differential(!)  No leukocytosis, normal H&H [CU]   1100 At bedside to reassess patient.  He got no relief from the GI cocktail or the Toradol.  Continues report the same dull chest pain [CU]   1114 Comprehensive metabolic panel [CU]   1114 D-Dimer: <0.19 [CU]   1114 Troponin I: <0.006 [CU]   1114 BNP: <10 [CU]   1114 No acute cardiopulmonary process identified [CU]   1124 Discussed with Dr. Gonzales the patient's cardiologist.  Patient has an angiogram scheduled in 4 days.  Dr. Gonzales would prefer to do the angiogram today or tomorrow depending on cath lab availability. Will place patietn in EDOU. Patient has been NPO today. [CU]   1136 Will place in EDOU pending procedure. Per Dr. Gonzales believe can discharge post procedure [CU]          ED Course User Index  [CU] Jose Cardona NP            Critical Care    Date/Time: 5/25/2023 11:37 AM  Performed by: Jose Cardona NP  Authorized by: Jose Maria Leonardo,    Direct patient critical care time: 12 minutes  Additional history critical care time: 6 minutes  Ordering / reviewing critical care time: 6 minutes  Documentation critical care time: 6 minutes  Consulting other physicians critical care time: 6 minutes  Total critical care time (exclusive of procedural time) : 36 minutes  Critical care was necessary to treat or prevent imminent or life-threatening deterioration of the following conditions: cardiac failure and circulatory failure.  Critical care was time spent personally by me on the following activities:  development of treatment plan with patient or surrogate, discussions with consultants, interpretation of cardiac output measurements, evaluation of patient's response to treatment, examination of patient, obtaining history from patient or surrogate, ordering and performing treatments and interventions, ordering and review of laboratory studies, ordering and review of radiographic studies, pulse oximetry, re-evaluation of patient's condition and review of old charts.            Diagnostic Impression:    1. Chest pain    2. PAF (paroxysmal atrial fibrillation)    3. RBBB    4. Chest pain due to myocardial ischemia, unspecified ischemic chest pain type    5. Other forms of angina pectoris         ED Disposition Condition    Observation Stable                  Jose Cardona, OBIE  05/25/23 1138       Jose Cardona, OBIE  05/25/23 1214       Jose Cardona, OBIE  05/25/23 1310

## 2023-05-25 NOTE — ED TRIAGE NOTES
Pt c/o midsternal diffuse chest pressure today. Relieved with nitro. Pt has angiogram for next week.

## 2023-05-25 NOTE — OR NURSING
Deflated 10cc of air from TR arm band.,  No swelling redness or bleeding noted .  Patient states no pain .

## 2023-05-25 NOTE — CONSULTS
Cardiology Consult  5/25/2023  12:38 PM    Attending Cardiologist: Pedro Gonzales M.D.  Primary Care Provider: Primary Doctor No  Chief Complaint/Reason For Consultation:  CP      Problem list  Patient Active Problem List   Diagnosis    Nasal bones, closed fracture    Syncope and collapse    Palpitations    RBBB    PAF (paroxysmal atrial fibrillation)    Other forms of angina pectoris       CC:   Chest pain    HPI:  Ten Parr is a 62 y.o.year-old male with PAF  who came to emergency department with complaint of chest heaviness that was not relieved with sublingual nitroglycerin.  Patient was scheduled for an outpatient coronary angiogram next week and started having chest pain today.  He called the office and was instructed to go to the emergency room.  Currently he is chest pain-free.    Medications  Current Facility-Administered Medications   Medication Dose Route Frequency Provider Last Rate Last Admin    0.9%  NaCl infusion   Intravenous Continuous Pedro Gonzales  mL/hr at 05/25/23 1207 New Bag at 05/25/23 1207    diphenhydrAMINE capsule 25 mg  25 mg Oral On Call Procedure Pedro Gonzales MD   25 mg at 05/25/23 1206    melatonin tablet 6 mg  6 mg Oral Nightly PRN Jose Cardona NP        sodium chloride 0.9% flush 10 mL  10 mL Intravenous PRN Jose Cardona NP          Prior to Admission medications    Medication Sig Start Date End Date Taking? Authorizing Provider   aspirin (ECOTRIN) 81 MG EC tablet Take 81 mg by mouth once daily.    Historical Provider   metoprolol tartrate (LOPRESSOR) 25 MG tablet Take 1 tablet (25 mg total) by mouth 2 (two) times daily as needed (palpitations). 3/22/23 3/21/24  Pedro Gonzales MD   nitroGLYCERIN (NITROSTAT) 0.4 MG SL tablet Place 1 tablet (0.4 mg total) under the tongue every 5 (five) minutes as needed for Chest pain. 5/19/23 5/18/24  Pedro Gonzales MD         History  No past medical history on file.  Past Surgical History:   Procedure  Laterality Date    NASAL SEPTUM SURGERY      RHINOPLASTY TIP       Social History     Socioeconomic History    Marital status: Single   Tobacco Use    Smoking status: Former     Packs/day: 1.00     Years: 15.00     Pack years: 15.00     Types: Cigarettes     Start date: 9/9/2002    Smokeless tobacco: Never   Substance and Sexual Activity    Alcohol use: Yes     Comment: socially    Drug use: Never         Allergies  Review of patient's allergies indicates:  No Known Allergies      Review of Systems   Review of Systems   Constitutional: Negative for decreased appetite, fever and weight loss.   HENT:  Negative for congestion and nosebleeds.    Eyes:  Negative for double vision, vision loss in left eye, vision loss in right eye and visual disturbance.   Cardiovascular:  Positive for chest pain. Negative for claudication, cyanosis, dyspnea on exertion, irregular heartbeat, leg swelling, near-syncope, orthopnea, palpitations, paroxysmal nocturnal dyspnea and syncope.   Respiratory:  Negative for cough, hemoptysis, shortness of breath, sleep disturbances due to breathing, snoring, sputum production and wheezing.    Endocrine: Negative for cold intolerance and heat intolerance.   Skin:  Negative for nail changes and rash.   Musculoskeletal:  Negative for joint pain, muscle cramps, muscle weakness and myalgias.   Gastrointestinal:  Negative for change in bowel habit, heartburn, hematemesis, hematochezia, hemorrhoids and melena.   Neurological:  Negative for dizziness, focal weakness and headaches.       Physical Exam  Wt Readings from Last 1 Encounters:   05/19/23 72 kg (158 lb 11.2 oz)     BP Readings from Last 3 Encounters:   05/25/23 115/73   05/19/23 126/80   04/17/23 116/80     Pulse Readings from Last 1 Encounters:   05/25/23 67     There is no height or weight on file to calculate BMI.    Physical Exam  Constitutional:       Appearance: He is well-developed.   HENT:      Head: Atraumatic.   Eyes:      General: No  scleral icterus.  Neck:      Vascular: Normal carotid pulses. No carotid bruit, hepatojugular reflux or JVD.   Cardiovascular:      Rate and Rhythm: Normal rate and regular rhythm.      Chest Wall: PMI is not displaced.      Pulses: Intact distal pulses.           Carotid pulses are 2+ on the right side and 2+ on the left side.       Radial pulses are 2+ on the right side and 2+ on the left side.        Dorsalis pedis pulses are 2+ on the right side and 2+ on the left side.      Heart sounds: Normal heart sounds, S1 normal and S2 normal. No murmur heard.    No friction rub.   Pulmonary:      Effort: Pulmonary effort is normal. No respiratory distress.      Breath sounds: Normal breath sounds. No stridor. No wheezing or rales.   Chest:      Chest wall: No tenderness.   Abdominal:      General: Bowel sounds are normal.      Palpations: Abdomen is soft.   Musculoskeletal:      Cervical back: Neck supple. No edema.   Skin:     General: Skin is warm and dry.      Nails: There is no clubbing.   Neurological:      Mental Status: He is alert and oriented to person, place, and time.   Psychiatric:         Behavior: Behavior normal.         Thought Content: Thought content normal.         Laboratory:  Trended Lab Data:  Recent Labs   Lab 05/19/23  1335 05/25/23  1039   WBC 6.06 4.98   HGB 15.1 15.4   HCT 45.7 45.1    187       Recent Labs   Lab 05/19/23  1335 05/25/23  1039    139   K 3.9 4.4    108   CO2 24 23   BUN 19 15   GLU 75 101   CALCIUM 8.9 9.3       Recent Labs   Lab 05/19/23  1335 05/25/23  1039   PROT 7.0 6.8   ALBUMIN 4.1 4.2   BILITOT 0.6 0.7   AST 17 17   ALT 23 21   ALKPHOS 68 72       No results for input(s): PROTIME, PTT, INR in the last 168 hours.    Cardiac:   Recent Labs   Lab 05/25/23  1039   TROPONINI <0.006   BNP <10       FLP: No results found for: CHOL, HDL, LDLCALC, TRIG, CHOLHDL  DM:   Lab Results   Component Value Date    CREATININE 0.9 05/25/2023     Thyroid:   Lab Results    Component Value Date    TSH 1.639 01/28/2023     Anemia: No results found for: IRON, TIBC, FERRITIN, [id number], FOLATE  Urinalysis: No results found for: LABURIN, COLORU, CLARITYU, SPECGRAV, LABSPEC, NITRITE, PROTEINUR, GLUCOSEU, KETONESU, UROBILINOGEN, BILIRUBINUR, BLOODU  @    Other Results:  EKG (my interpretation):    TELEMETRY:  sinus    Echo:   Results for orders placed or performed during the hospital encounter of 02/24/23   Echo   Result Value Ref Range    BSA 1.87 m2    TDI SEPTAL 0.06 m/s    LV LATERAL E/E' RATIO 6.22 m/s    LV SEPTAL E/E' RATIO 9.33 m/s    LA WIDTH 3.10 cm    Left Ventricular Outflow Tract Mean Velocity 0.65 cm/s    Left Ventricular Outflow Tract Mean Gradient 1.96 mmHg    TDI LATERAL 0.09 m/s    PV PEAK VELOCITY 1.13 cm/s    LVIDd 4.15 3.5 - 6.0 cm    IVS 0.70 0.6 - 1.1 cm    Posterior Wall 0.75 0.6 - 1.1 cm    LVIDs 2.30 2.1 - 4.0 cm    FS 45 28 - 44 %    LA volume 21.88 cm3    LV mass 87.25 g    LA size 2.75 cm    TAPSE 1.60 cm    Left Ventricle Relative Wall Thickness 0.36 cm    AV mean gradient 3 mmHg    AV valve area 3.67 cm2    AV Velocity Ratio 0.83     AV index (prosthetic) 0.93     MV valve area p 1/2 method 2.61 cm2    E/A ratio 0.67     Mean e' 0.08 m/s    E wave deceleration time 290.25 msec    IVRT 83.04 msec    Pulm vein S/D ratio 2.22     LVOT diameter 2.24 cm    LVOT area 3.9 cm2    LVOT peak grant 1.00 m/s    LVOT peak VTI 16.40 cm    Ao peak grant 1.20 m/s    Ao VTI 17.6 cm    Mr max grant 3.12 m/s    LVOT stroke volume 64.60 cm3    AV peak gradient 6 mmHg    E/E' ratio 7.47 m/s    MV Peak E Grant 0.56 m/s    TR Max Grant 1.92 m/s    MV stenosis pressure 1/2 time 84.17 ms    MV Peak A Grant 0.83 m/s    PV Peak S Grant 0.60 m/s    PV Peak D Grant 0.27 m/s    LV Systolic Volume 18.21 mL    LV Systolic Volume Index 9.8 mL/m2    LV Diastolic Volume 76.45 mL    LV Diastolic Volume Index 41.10 mL/m2    LA Volume Index 11.8 mL/m2    LV Mass Index 47 g/m2    RA Major Axis 4.00 cm    Left  Atrium Minor Axis 3.03 cm    Left Atrium Major Axis 3.01 cm    Triscuspid Valve Regurgitation Peak Gradient 15 mmHg    LA Volume Index (Mod) 18.3 mL/m2    LA volume (mod) 34.00 cm3    RA Width 2.40 cm    EF 70 %    Narrative    · The left ventricle is normal in size with normal systolic function.  · The estimated ejection fraction is 70%.  · Grade I left ventricular diastolic dysfunction.  · Normal right ventricular size with normal right ventricular systolic   function.          Radiology:  X-Ray Chest AP Portable    Result Date: 5/25/2023  EXAMINATION: XR CHEST AP PORTABLE CLINICAL HISTORY: Chest Pain; FINDINGS: Heart size is normal.  Lungs are clear and the bones are noncontributory.     No acute process seen. Electronically signed by: Riki Marley MD Date:    05/25/2023 Time:    10:50        Current Medications:     Infusions:   sodium chloride 0.9% 100 mL/hr at 05/25/23 1207        Scheduled:       PRN:  diphenhydrAMINE, melatonin, sodium chloride 0.9%      Assessment and Plan:  New onset exertional CP concerning for unstable angina.  EKG showed sinus with RBBB- unchanged.  Troponin neg x 1.  Proceed with coronary angiogram today    Thank you for allowing me to participate in the care of Ten Parr.      Pedro Gonzales MD, F.A.C.C, F.S.C.A.I.    Disclaimer: This document was created using voice recognition software ([employer] Fluency Direct). Although it may be edited, this document may contain errors related to incorrect recognition of the spoken word. Please call the physician if clarification is needed.

## 2023-05-25 NOTE — Clinical Note
The radial band was applied to the right radial artery. 9 cc's of air were inserted into the closure device.

## 2023-06-05 ENCOUNTER — TELEPHONE (OUTPATIENT)
Dept: INTERNAL MEDICINE | Facility: CLINIC | Age: 63
End: 2023-06-05
Payer: COMMERCIAL

## 2023-06-05 ENCOUNTER — PATIENT MESSAGE (OUTPATIENT)
Dept: CARDIOLOGY | Facility: CLINIC | Age: 63
End: 2023-06-05
Payer: COMMERCIAL

## 2023-06-16 ENCOUNTER — OFFICE VISIT (OUTPATIENT)
Dept: CARDIOLOGY | Facility: CLINIC | Age: 63
End: 2023-06-16
Payer: COMMERCIAL

## 2023-06-16 VITALS
BODY MASS INDEX: 24.98 KG/M2 | HEART RATE: 74 BPM | HEIGHT: 67 IN | DIASTOLIC BLOOD PRESSURE: 86 MMHG | OXYGEN SATURATION: 98 % | SYSTOLIC BLOOD PRESSURE: 132 MMHG | WEIGHT: 159.13 LBS

## 2023-06-16 DIAGNOSIS — R00.2 PALPITATIONS: ICD-10-CM

## 2023-06-16 DIAGNOSIS — I48.0 PAF (PAROXYSMAL ATRIAL FIBRILLATION): Primary | ICD-10-CM

## 2023-06-16 DIAGNOSIS — I45.10 RBBB: ICD-10-CM

## 2023-06-16 PROCEDURE — 1159F MED LIST DOCD IN RCRD: CPT | Mod: CPTII,S$GLB,, | Performed by: INTERNAL MEDICINE

## 2023-06-16 PROCEDURE — 3079F PR MOST RECENT DIASTOLIC BLOOD PRESSURE 80-89 MM HG: ICD-10-PCS | Mod: CPTII,S$GLB,, | Performed by: INTERNAL MEDICINE

## 2023-06-16 PROCEDURE — 3008F BODY MASS INDEX DOCD: CPT | Mod: CPTII,S$GLB,, | Performed by: INTERNAL MEDICINE

## 2023-06-16 PROCEDURE — 3075F PR MOST RECENT SYSTOLIC BLOOD PRESS GE 130-139MM HG: ICD-10-PCS | Mod: CPTII,S$GLB,, | Performed by: INTERNAL MEDICINE

## 2023-06-16 PROCEDURE — 99214 OFFICE O/P EST MOD 30 MIN: CPT | Mod: S$GLB,,, | Performed by: INTERNAL MEDICINE

## 2023-06-16 PROCEDURE — 3008F PR BODY MASS INDEX (BMI) DOCUMENTED: ICD-10-PCS | Mod: CPTII,S$GLB,, | Performed by: INTERNAL MEDICINE

## 2023-06-16 PROCEDURE — 3079F DIAST BP 80-89 MM HG: CPT | Mod: CPTII,S$GLB,, | Performed by: INTERNAL MEDICINE

## 2023-06-16 PROCEDURE — 99999 PR PBB SHADOW E&M-EST. PATIENT-LVL III: ICD-10-PCS | Mod: PBBFAC,,, | Performed by: INTERNAL MEDICINE

## 2023-06-16 PROCEDURE — 3075F SYST BP GE 130 - 139MM HG: CPT | Mod: CPTII,S$GLB,, | Performed by: INTERNAL MEDICINE

## 2023-06-16 PROCEDURE — 1159F PR MEDICATION LIST DOCUMENTED IN MEDICAL RECORD: ICD-10-PCS | Mod: CPTII,S$GLB,, | Performed by: INTERNAL MEDICINE

## 2023-06-16 PROCEDURE — 99214 PR OFFICE/OUTPT VISIT, EST, LEVL IV, 30-39 MIN: ICD-10-PCS | Mod: S$GLB,,, | Performed by: INTERNAL MEDICINE

## 2023-06-16 PROCEDURE — 99999 PR PBB SHADOW E&M-EST. PATIENT-LVL III: CPT | Mod: PBBFAC,,, | Performed by: INTERNAL MEDICINE

## 2023-06-16 NOTE — PROGRESS NOTES
Cardiology    6/16/2023  9:34 AM    Problem list  Patient Active Problem List   Diagnosis    Nasal bones, closed fracture    Syncope and collapse    Palpitations    RBBB    PAF (paroxysmal atrial fibrillation)    Other forms of angina pectoris       CC:  F/u    HPI:  He is doing better.  He now has identify the triggers for his atrial fibrillation.  To triggers are alcohol and exercising vigorously.  He still wants to go see EP but wants to hold off for now.  He takes the metoprolol as needed.  He does not want to take it on a regular basis because it makes him feel fatigued.  Overall he is feels much better.  He denies daytime somnolence or snoring loudly    Medications  Current Outpatient Medications   Medication Sig Dispense Refill    metoprolol tartrate (LOPRESSOR) 25 MG tablet Take 1 tablet (25 mg total) by mouth 2 (two) times daily as needed (palpitations). 60 tablet 11     No current facility-administered medications for this visit.      Prior to Admission medications    Medication Sig Start Date End Date Taking? Authorizing Provider   metoprolol tartrate (LOPRESSOR) 25 MG tablet Take 1 tablet (25 mg total) by mouth 2 (two) times daily as needed (palpitations). 3/22/23 3/21/24 Yes Pedro Gonzales MD         History  No past medical history on file.  Past Surgical History:   Procedure Laterality Date    CORONARY ANGIOGRAPHY N/A 5/25/2023    Procedure: ANGIOGRAM, CORONARY ARTERY;  Surgeon: Pedro Gonzales MD;  Location: [facility] CATH LAB;  Service: Cardiology;  Laterality: N/A;    NASAL SEPTUM SURGERY      RHINOPLASTY TIP       Social History     Socioeconomic History    Marital status: Single   Tobacco Use    Smoking status: Former     Packs/day: 1.00     Years: 15.00     Pack years: 15.00     Types: Cigarettes     Start date: 9/9/2002    Smokeless tobacco: Never   Substance and Sexual Activity    Alcohol use: Yes     Comment: socially    Drug use: Never         Allergies  Review of patient's allergies  indicates:  No Known Allergies      Review of Systems   Review of Systems   Constitutional: Negative for decreased appetite, fever and weight loss.   HENT:  Negative for congestion and nosebleeds.    Eyes:  Negative for double vision, vision loss in left eye, vision loss in right eye and visual disturbance.   Cardiovascular:  Negative for chest pain, claudication, cyanosis, dyspnea on exertion, irregular heartbeat, leg swelling, near-syncope, orthopnea, palpitations, paroxysmal nocturnal dyspnea and syncope.   Respiratory:  Negative for cough, hemoptysis, shortness of breath, sleep disturbances due to breathing, snoring, sputum production and wheezing.    Endocrine: Negative for cold intolerance and heat intolerance.   Skin:  Negative for nail changes and rash.   Musculoskeletal:  Negative for joint pain, muscle cramps, muscle weakness and myalgias.   Gastrointestinal:  Negative for change in bowel habit, heartburn, hematemesis, hematochezia, hemorrhoids and melena.   Neurological:  Negative for dizziness, focal weakness and headaches.       Physical Exam  Wt Readings from Last 1 Encounters:   06/16/23 72.2 kg (159 lb 1.6 oz)     BP Readings from Last 3 Encounters:   06/16/23 132/86   05/25/23 122/68   05/19/23 126/80     Pulse Readings from Last 1 Encounters:   06/16/23 74     Body mass index is 24.92 kg/m².    Physical Exam  Constitutional:       Appearance: He is well-developed.   HENT:      Head: Atraumatic.   Eyes:      General: No scleral icterus.  Neck:      Vascular: Normal carotid pulses. No carotid bruit, hepatojugular reflux or JVD.   Cardiovascular:      Rate and Rhythm: Normal rate and regular rhythm.      Chest Wall: PMI is not displaced.      Pulses: Intact distal pulses.           Carotid pulses are 2+ on the right side and 2+ on the left side.       Radial pulses are 2+ on the right side and 2+ on the left side.        Dorsalis pedis pulses are 2+ on the right side and 2+ on the left side.       Heart sounds: Normal heart sounds, S1 normal and S2 normal. No murmur heard.    No friction rub.   Pulmonary:      Effort: Pulmonary effort is normal. No respiratory distress.      Breath sounds: Normal breath sounds. No stridor. No wheezing or rales.   Chest:      Chest wall: No tenderness.   Abdominal:      General: Bowel sounds are normal.      Palpations: Abdomen is soft.   Musculoskeletal:      Cervical back: Neck supple. No edema.   Skin:     General: Skin is warm and dry.      Nails: There is no clubbing.   Neurological:      Mental Status: He is alert and oriented to person, place, and time.   Psychiatric:         Behavior: Behavior normal.         Thought Content: Thought content normal.           Assessment  1. PAF (paroxysmal atrial fibrillation).  [id number] = 0 (male, < 61 y/o)  Stable in sinus, on metoprolol p.r.n.    2. RBBB  Stable    3. Palpitations  Stable        Plan and Discussion  Discussed that his treadmill stress test was negative for ischemia.  His coronary angiogram showed normal coronary arteries.  He has paroxysmal atrial fibrillation which has been controlled.  He wants to discuss with EP regarding his atrial fibrillation.  He was to continue taking metoprolol on a as needed basis rather than scheduled.    Follow Up  6 months      Pedro Gonzales MD, F.A.C.C, F.S.C.A.I.        30 minutes were spent in chart review, documentation and review of results, and evaluation, treatment, and counseling of patient on the same day of service.    Disclaimer: This document was created using voice recognition software ([employer] Direct). Although it may be edited, this document may contain errors related to incorrect recognition of the spoken word. Please call the physician if clarification is needed.

## 2023-08-11 ENCOUNTER — HOSPITAL ENCOUNTER (EMERGENCY)
Facility: OTHER | Age: 63
Discharge: HOME | End: 2023-08-11
Attending: EMERGENCY MEDICINE
Payer: COMMERCIAL

## 2023-08-11 VITALS
OXYGEN SATURATION: 98 % | TEMPERATURE: 98 F | WEIGHT: 160 LBS | DIASTOLIC BLOOD PRESSURE: 77 MMHG | HEART RATE: 60 BPM | RESPIRATION RATE: 17 BRPM | HEIGHT: 67 IN | BODY MASS INDEX: 25.11 KG/M2 | SYSTOLIC BLOOD PRESSURE: 127 MMHG

## 2023-08-11 DIAGNOSIS — R11.0 NAUSEA: ICD-10-CM

## 2023-08-11 DIAGNOSIS — R10.9 ABDOMINAL PAIN, UNSPECIFIED ABDOMINAL LOCATION: Primary | ICD-10-CM

## 2023-08-11 LAB
ALBUMIN SERPL BCP-MCNC: 4.1 G/DL (ref 3.5–5.2)
ALP SERPL-CCNC: 85 U/L (ref 55–135)
ALT SERPL W/O P-5'-P-CCNC: 19 U/L (ref 10–44)
ANION GAP SERPL CALC-SCNC: 10 MMOL/L (ref 8–16)
AST SERPL-CCNC: 21 U/L (ref 10–40)
BACTERIA #/AREA URNS HPF: NORMAL /HPF
BASOPHILS # BLD AUTO: 0.05 K/UL (ref 0–0.2)
BASOPHILS NFR BLD: 0.5 % (ref 0–1.9)
BILIRUB SERPL-MCNC: 0.6 MG/DL (ref 0.1–1)
BILIRUB UR QL STRIP: NEGATIVE
BUN SERPL-MCNC: 18 MG/DL (ref 6–20)
CALCIUM SERPL-MCNC: 9.3 MG/DL (ref 8.7–10.5)
CHLORIDE SERPL-SCNC: 103 MMOL/L (ref 95–110)
CLARITY UR: CLEAR
CO2 SERPL-SCNC: 24 MMOL/L (ref 23–29)
COLOR UR: COLORLESS
CREAT SERPL-MCNC: 1.1 MG/DL (ref 0.5–1.4)
DIFFERENTIAL METHOD: ABNORMAL
EOSINOPHIL # BLD AUTO: 2.3 K/UL (ref 0–0.5)
EOSINOPHIL NFR BLD: 22.4 % (ref 0–8)
ERYTHROCYTE [DISTWIDTH] IN BLOOD BY AUTOMATED COUNT: 12.2 % (ref 11.5–14.5)
EST. GFR  (NO RACE VARIABLE): >60 ML/MIN/1.73 M^2
GLUCOSE SERPL-MCNC: 107 MG/DL (ref 70–110)
GLUCOSE UR QL STRIP: NEGATIVE
HCT VFR BLD AUTO: 48.7 % (ref 40–54)
HGB BLD-MCNC: 16.5 G/DL (ref 14–18)
HGB UR QL STRIP: NEGATIVE
IMM GRANULOCYTES # BLD AUTO: 0.04 K/UL (ref 0–0.04)
IMM GRANULOCYTES NFR BLD AUTO: 0.4 % (ref 0–0.5)
KETONES UR QL STRIP: NEGATIVE
LEUKOCYTE ESTERASE UR QL STRIP: ABNORMAL
LIPASE SERPL-CCNC: 39 U/L (ref 4–60)
LYMPHOCYTES # BLD AUTO: 2.6 K/UL (ref 1–4.8)
LYMPHOCYTES NFR BLD: 25.6 % (ref 18–48)
MCH RBC QN AUTO: 29.6 PG (ref 27–31)
MCHC RBC AUTO-ENTMCNC: 33.9 G/DL (ref 32–36)
MCV RBC AUTO: 87 FL (ref 82–98)
MICROSCOPIC COMMENT: NORMAL
MONOCYTES # BLD AUTO: 0.7 K/UL (ref 0.3–1)
MONOCYTES NFR BLD: 6.8 % (ref 4–15)
NEUTROPHILS # BLD AUTO: 4.5 K/UL (ref 1.8–7.7)
NEUTROPHILS NFR BLD: 44.3 % (ref 38–73)
NITRITE UR QL STRIP: NEGATIVE
NRBC BLD-RTO: 0 /100 WBC
PH UR STRIP: 5 [PH] (ref 5–8)
PLATELET # BLD AUTO: 219 K/UL (ref 150–450)
PLATELET BLD QL SMEAR: ABNORMAL
PMV BLD AUTO: 8.7 FL (ref 9.2–12.9)
POTASSIUM SERPL-SCNC: 4.3 MMOL/L (ref 3.5–5.1)
PROT SERPL-MCNC: 7.1 G/DL (ref 6–8.4)
PROT UR QL STRIP: NEGATIVE
RBC # BLD AUTO: 5.58 M/UL (ref 4.6–6.2)
RBC #/AREA URNS HPF: 1 /HPF (ref 0–4)
SODIUM SERPL-SCNC: 137 MMOL/L (ref 136–145)
SP GR UR STRIP: 1 (ref 1–1.03)
URN SPEC COLLECT METH UR: ABNORMAL
UROBILINOGEN UR STRIP-ACNC: NEGATIVE EU/DL
WBC # BLD AUTO: 10.04 K/UL (ref 3.9–12.7)
WBC #/AREA URNS HPF: 2 /HPF (ref 0–5)

## 2023-08-11 PROCEDURE — 80053 COMPREHEN METABOLIC PANEL: CPT | Performed by: PHYSICIAN ASSISTANT

## 2023-08-11 PROCEDURE — 99285 EMERGENCY DEPT VISIT HI MDM: CPT | Mod: 25

## 2023-08-11 PROCEDURE — 81000 URINALYSIS NONAUTO W/SCOPE: CPT | Performed by: PHYSICIAN ASSISTANT

## 2023-08-11 PROCEDURE — 83690 ASSAY OF LIPASE: CPT | Performed by: PHYSICIAN ASSISTANT

## 2023-08-11 PROCEDURE — 25500020 PHARM REV CODE 255: Performed by: EMERGENCY MEDICINE

## 2023-08-11 PROCEDURE — 96361 HYDRATE IV INFUSION ADD-ON: CPT

## 2023-08-11 PROCEDURE — 85025 COMPLETE CBC W/AUTO DIFF WBC: CPT | Performed by: PHYSICIAN ASSISTANT

## 2023-08-11 PROCEDURE — 63600175 PHARM REV CODE 636 W HCPCS

## 2023-08-11 PROCEDURE — 96374 THER/PROPH/DIAG INJ IV PUSH: CPT

## 2023-08-11 PROCEDURE — 25000003 PHARM REV CODE 250

## 2023-08-11 PROCEDURE — 96375 TX/PRO/DX INJ NEW DRUG ADDON: CPT

## 2023-08-11 RX ORDER — FAMOTIDINE 10 MG/ML
40 INJECTION INTRAVENOUS
Status: COMPLETED | OUTPATIENT
Start: 2023-08-11 | End: 2023-08-11

## 2023-08-11 RX ORDER — OMEPRAZOLE 20 MG/1
20 CAPSULE, DELAYED RELEASE ORAL DAILY
Qty: 30 CAPSULE | Refills: 0 | Status: SHIPPED | OUTPATIENT
Start: 2023-08-11 | End: 2023-09-10

## 2023-08-11 RX ORDER — ONDANSETRON 2 MG/ML
4 INJECTION INTRAMUSCULAR; INTRAVENOUS
Status: COMPLETED | OUTPATIENT
Start: 2023-08-11 | End: 2023-08-11

## 2023-08-11 RX ORDER — SUCRALFATE 1 G/10ML
1 SUSPENSION ORAL
Status: COMPLETED | OUTPATIENT
Start: 2023-08-11 | End: 2023-08-11

## 2023-08-11 RX ORDER — KETOROLAC TROMETHAMINE 30 MG/ML
15 INJECTION, SOLUTION INTRAMUSCULAR; INTRAVENOUS
Status: COMPLETED | OUTPATIENT
Start: 2023-08-11 | End: 2023-08-11

## 2023-08-11 RX ORDER — MAG HYDROX/ALUMINUM HYD/SIMETH 200-200-20
30 SUSPENSION, ORAL (FINAL DOSE FORM) ORAL
Qty: 354 ML | Refills: 0 | Status: SHIPPED | OUTPATIENT
Start: 2023-08-11 | End: 2023-08-18

## 2023-08-11 RX ORDER — ONDANSETRON 2 MG/ML
4 INJECTION INTRAMUSCULAR; INTRAVENOUS
Status: DISCONTINUED | OUTPATIENT
Start: 2023-08-11 | End: 2023-08-11

## 2023-08-11 RX ORDER — ONDANSETRON 4 MG/1
4 TABLET, ORALLY DISINTEGRATING ORAL 2 TIMES DAILY PRN
Qty: 14 TABLET | Refills: 0 | Status: SHIPPED | OUTPATIENT
Start: 2023-08-11 | End: 2023-08-18

## 2023-08-11 RX ADMIN — SODIUM CHLORIDE 1000 ML: 9 INJECTION, SOLUTION INTRAVENOUS at 11:08

## 2023-08-11 RX ADMIN — ONDANSETRON 4 MG: 2 INJECTION INTRAMUSCULAR; INTRAVENOUS at 11:08

## 2023-08-11 RX ADMIN — FAMOTIDINE 40 MG: 10 INJECTION, SOLUTION INTRAVENOUS at 11:08

## 2023-08-11 RX ADMIN — IOHEXOL 75 ML: 350 INJECTION, SOLUTION INTRAVENOUS at 12:08

## 2023-08-11 RX ADMIN — SUCRALFATE 1 G: 1 SUSPENSION ORAL at 01:08

## 2023-08-11 RX ADMIN — KETOROLAC TROMETHAMINE 15 MG: 30 INJECTION, SOLUTION INTRAMUSCULAR; INTRAVENOUS at 12:08

## 2023-08-11 NOTE — FIRST PROVIDER EVALUATION
Emergency Department TeleTriage Encounter Note      CHIEF COMPLAINT    Chief Complaint   Patient presents with    Abdominal Pain     C/o intermitting bilateral lower abd pain 4/10, n/d x 2 wks worst on Tuesday. -vomiting. Last BM today. VSS        VITAL SIGNS   Initial Vitals [08/11/23 1012]   BP Pulse Resp Temp SpO2   (!) 159/83 68 18 98 °F (36.7 °C) 96 %      MAP       --            ALLERGIES    Review of patient's allergies indicates:  No Known Allergies    PROVIDER TRIAGE NOTE  Patient presents with 2 week history of intermittent bilateral lower abdominal pain.2 day history of nausea and diarrhea. No melena or hematochezia.       ORDERS  Labs Reviewed - No data to display    ED Orders (720h ago, onward)      None              Virtual Visit Note: The provider triage portion of this emergency department evaluation and documentation was performed via Tapit, a HIPAA-compliant telemedicine application, in concert with a tele-presenter in the room. A face to face patient evaluation with one of my colleagues will occur once the patient is placed in an emergency department room.      DISCLAIMER: This note was prepared with [employer] voice recognition transcription software. Garbled syntax, mangled pronouns, and other bizarre constructions may be attributed to that software system.

## 2023-08-11 NOTE — DISCHARGE INSTRUCTIONS
Please take Zofran as needed for nausea.  Please try omeprazole for your pain. If Omeprazole does not help, please try Maalox.  Schedule an appointment with GI as soon as possible.

## 2023-08-11 NOTE — ED TRIAGE NOTES
Pt arrived with c/o intermittent diffuse abd pain x 2 weeks.  Denies any precipitating factors.  +n/-v/+d.  Reports loose and watery, denies any fever or urinary symptoms.  Pt reports pain is burning in nature.  Pt answering questions appropriately, speaking in complete sentences, respirations even and unlabored.  Aao x 4.

## 2023-08-12 NOTE — ED PROVIDER NOTES
"Encounter Date: 8/11/2023       History     Chief Complaint   Patient presents with    Abdominal Pain     C/o intermitting bilateral lower abd pain 4/10, n/d x 2 wks worst on Tuesday. -vomiting. Last BM today. VSS      Ten Parr is a 62 y.o. male with history of acid reflux and proximal atrial fibrillation presenting to the emergency department for evaluation of intermittent, worsening, periumbilical pain for the past 2 weeks that has worsened in the last 3 days. He describes the pain as a "burning" sensation. No exacerbating or alleviating factors. Reports the pain comes on and resolves on its own. No relief with OTC antiacids. He reports the pain did resolve after his friend gave him a "pain pill" a few days ago when his pain was severe. He reports associated nausea and few episodes of loose and watery nonbloody diarrhea.  He denies fever, chills, headaches, dizziness, lightheadedness, cold symptoms, cough, shortness of breath, chest pain, abdominal distension, constipation, blood in stool, or urinary symptoms.  States that he has a healthy diet.  He denies recent sick contacts. No history of abdominal surgeries. No history of gastric ulcers. He has not been evaluated by GI.  States that he is a social drinker.  No recent heavy consumption of alcohol.  He denies use of illicit substances.          The history is provided by the patient.     Review of patient's allergies indicates:  No Known Allergies  Past Medical History:   Diagnosis Date    Paroxysmal atrial fibrillation      Past Surgical History:   Procedure Laterality Date    CORONARY ANGIOGRAPHY N/A 5/25/2023    Procedure: ANGIOGRAM, CORONARY ARTERY;  Surgeon: Pedro Gonzales MD;  Location: [facility] CATH LAB;  Service: Cardiology;  Laterality: N/A;    NASAL SEPTUM SURGERY      RHINOPLASTY TIP       Family History   Problem Relation Age of Onset    Alzheimer's disease Mother     Heart disease Father     Dementia Father     Heart attack Brother     Melanoma " Neg Hx      Social History     Tobacco Use    Smoking status: Former     Current packs/day: 1.00     Average packs/day: 1 pack/day for 20.9 years (20.9 ttl pk-yrs)     Types: Cigarettes     Start date: 9/9/2002    Smokeless tobacco: Never   Substance Use Topics    Alcohol use: Yes     Comment: socially    Drug use: Never     Review of Systems   Constitutional:  Negative for chills and fever.   HENT:  Negative for congestion, rhinorrhea and sore throat.    Respiratory:  Negative for cough and shortness of breath.    Cardiovascular:  Negative for chest pain.   Gastrointestinal:  Positive for abdominal pain, diarrhea and nausea. Negative for abdominal distention, blood in stool, constipation and vomiting.   Genitourinary:  Negative for dysuria, frequency and urgency.   Musculoskeletal:  Negative for back pain.   Skin:  Negative for rash.   Neurological:  Negative for dizziness and headaches.   Psychiatric/Behavioral:  Negative for confusion.        Physical Exam     Initial Vitals [08/11/23 1012]   BP Pulse Resp Temp SpO2   (!) 159/83 68 18 98 °F (36.7 °C) 96 %      MAP       --         Physical Exam    Vitals reviewed.  Constitutional: He appears well-developed and well-nourished. No distress.   HENT:   Head: Normocephalic and atraumatic.   Nose: Nose normal.   Mouth/Throat: Oropharynx is clear and moist.   Eyes: Conjunctivae and EOM are normal.   Neck: Neck supple.   Normal range of motion.  Cardiovascular:  Normal rate, regular rhythm, normal heart sounds and intact distal pulses.           Pulmonary/Chest: Breath sounds normal. No respiratory distress. He has no wheezes. He has no rhonchi. He has no rales. He exhibits no tenderness.   Abdominal: Abdomen is soft. Bowel sounds are normal. He exhibits no distension and no mass. There is abdominal tenderness.   Mild generalized abdominal tenderness to palpation.  There is no rebound and no guarding.   Musculoskeletal:         General: No edema. Normal range of motion.       Cervical back: Normal range of motion and neck supple.     Neurological: He is alert and oriented to person, place, and time. He has normal strength.   Skin: Skin is warm and dry.   Psychiatric: He has a normal mood and affect. His behavior is normal. Judgment and thought content normal.         ED Course   Procedures  Labs Reviewed   CBC W/ AUTO DIFFERENTIAL - Abnormal; Notable for the following components:       Result Value    MPV 8.7 (*)     Eos # 2.3 (*)     Eosinophil % 22.4 (*)     All other components within normal limits   URINALYSIS, REFLEX TO URINE CULTURE - Abnormal; Notable for the following components:    Color, UA Colorless (*)     Leukocytes, UA 1+ (*)     All other components within normal limits    Narrative:     Specimen Source->Urine   COMPREHENSIVE METABOLIC PANEL   LIPASE   URINALYSIS MICROSCOPIC    Narrative:     Specimen Source->Urine          Imaging Results              CT Abdomen Pelvis With Contrast (Final result)  Result time 08/11/23 13:38:16      Final result by Joni Marley MD (08/11/23 13:38:16)                   Impression:      No acute process.  The prostate is enlarged.      Electronically signed by: Joni Marley MD  Date:    08/11/2023  Time:    13:38               Narrative:    EXAMINATION:  CT ABDOMEN PELVIS WITH CONTRAST    CLINICAL HISTORY:  Abdominal pain, acute, nonlocalized;    TECHNIQUE:  Low dose axial images, sagittal and coronal reformations were obtained from the lung bases to the pubic symphysis following the IV administration of 75 mL of Omnipaque 350 .  Oral contrast was not given.    COMPARISON:  None.    FINDINGS:  Limited evaluation lung bases show no concerning masses or nodules.    The liver is normal in size.  No solid mass or biliary ductal dilatation.  Gallbladder is unremarkable.    Spleen, adrenal glands, and pancreas show no focal abnormality.    Bilateral kidneys are normal in size and position.  Peripelvic cyst on the left.  Urinary  bladder shows no focal wall thickening.  Prostate is enlarged.    Gastrointestinal tract shows no wall thickening or obstruction.  No free fluid or free gas.  No concerning lymphadenopathy.    Vascular structures show mild atherosclerosis without aneurysm.  Degenerative change at the lumbosacral junction and L4-5 without an acute fracture or destructive lesion.                                       Medications   sodium chloride 0.9% bolus 1,000 mL 1,000 mL (0 mLs Intravenous Stopped 8/11/23 1206)   ondansetron injection 4 mg (4 mg Intravenous Given 8/11/23 1132)   famotidine (PF) injection 40 mg (40 mg Intravenous Given 8/11/23 1126)   ketorolac injection 15 mg (15 mg Intravenous Given 8/11/23 1208)   iohexoL (OMNIPAQUE 350) injection 75 mL (75 mLs Intravenous Given 8/11/23 1229)   sucralfate 100 mg/mL suspension 1 g (1 g Oral Given 8/11/23 1332)     Medical Decision Making:   Initial Assessment:   Urgent evaluation of 62-year-old male who presents with abdominal pain, nausea, and few episodes of diarrhea. No fever, abdominal distension, vomiting, blood in stool, urinary symptoms, SOB, or chest pain.  He is well-appearing and nontoxic.  He is hemodynamically stable.  On exam, he does have mild generalized abdominal tenderness to palpation.  Plan for labs.  Will give IV fluids and IV Pepcid and reassess.  Will wait on lab results to determine if imaging is necessary.  Clinical Tests:   Lab Tests: Ordered and Reviewed  Radiological Study: Ordered and Reviewed  ED Management:  On review of labs, no leukocytosis.  H&H stable.  CMP is grossly unremarkable.  Lipase is within normal limits.  1+ leukocytes on UA with only 2 white blood cells on microscopy.  Patient is not experiencing urinary symptoms.  I updated the patient on results. He reports his pain is still present. Will give Toradol and reassess.  Will obtain CT abdomen and pelvis.    On review of CT, there is no acute abnormality. No signs of infectious,  inflammatory process or obstruction.  I updated the patient on all results.  Reports his pain did resolve after Toradol but is now starting to return.  Will give Carafate.  On further conversations with the patient, stated that we ruled out emergent etiologies of abdominal pain today.  Recommended follow-up with GI for further evaluation.  Will discharge home with Maalox, omeprazole, and Zofran.  Encouraged the patient to stay hydrated.  Patient verbalized understanding and agreement with this plan of care. He was given specific return precautions. Advised to follow up with PCP as needed. All questions and concerns addressed. He is stable for discharge.                           Clinical Impression:   Final diagnoses:  [R10.9] Abdominal pain, unspecified abdominal location (Primary)  [R11.0] Nausea        ED Disposition Condition    Discharge Stable          ED Prescriptions       Medication Sig Dispense Start Date End Date Auth. Provider    aluminum-magnesium hydroxide-simethicone (MAALOX) 200-200-20 mg/5 mL Susp Take 30 mLs by mouth 4 (four) times daily before meals and nightly. for 7 days 354 mL 8/11/2023 8/18/2023 Hugh Watkins PA-C    omeprazole (PRILOSEC) 20 MG capsule Take 1 capsule (20 mg total) by mouth once daily. 30 capsule 8/11/2023 9/10/2023 Hugh Watkins PA-C    ondansetron (ZOFRAN-ODT) 4 MG TbDL Take 1 tablet (4 mg total) by mouth 2 (two) times daily as needed (for nausea and vomiting). 14 tablet 8/11/2023 8/18/2023 Hugh Watkins PA-C          Follow-up Information    None          Hugh Watkins PA-C  08/12/23 [id number]

## 2023-10-20 DIAGNOSIS — I48.0 PAROXYSMAL ATRIAL FIBRILLATION: Primary | ICD-10-CM

## 2023-10-26 ENCOUNTER — OFFICE VISIT (OUTPATIENT)
Dept: ELECTROPHYSIOLOGY | Facility: CLINIC | Age: 63
End: 2023-10-26
Payer: COMMERCIAL

## 2023-10-26 ENCOUNTER — HOSPITAL ENCOUNTER (OUTPATIENT)
Dept: CARDIOLOGY | Facility: CLINIC | Age: 63
Discharge: HOME | End: 2023-10-26
Payer: COMMERCIAL

## 2023-10-26 VITALS
WEIGHT: 163.13 LBS | BODY MASS INDEX: 25.6 KG/M2 | HEIGHT: 67 IN | SYSTOLIC BLOOD PRESSURE: 122 MMHG | HEART RATE: 60 BPM | DIASTOLIC BLOOD PRESSURE: 76 MMHG

## 2023-10-26 DIAGNOSIS — I48.0 PAF (PAROXYSMAL ATRIAL FIBRILLATION): Primary | ICD-10-CM

## 2023-10-26 DIAGNOSIS — I45.10 RBBB: ICD-10-CM

## 2023-10-26 DIAGNOSIS — I48.0 PAROXYSMAL ATRIAL FIBRILLATION: ICD-10-CM

## 2023-10-26 PROCEDURE — 93005 RHYTHM STRIP: ICD-10-PCS | Mod: S$GLB,,, | Performed by: INTERNAL MEDICINE

## 2023-10-26 PROCEDURE — 99999 PR PBB SHADOW E&M-EST. PATIENT-LVL III: CPT | Mod: PBBFAC,,, | Performed by: INTERNAL MEDICINE

## 2023-10-26 PROCEDURE — 99205 PR OFFICE/OUTPT VISIT, NEW, LEVL V, 60-74 MIN: ICD-10-PCS | Mod: S$GLB,,, | Performed by: INTERNAL MEDICINE

## 2023-10-26 PROCEDURE — 93010 RHYTHM STRIP: ICD-10-PCS | Mod: S$GLB,,, | Performed by: INTERNAL MEDICINE

## 2023-10-26 PROCEDURE — 1159F MED LIST DOCD IN RCRD: CPT | Mod: CPTII,S$GLB,, | Performed by: INTERNAL MEDICINE

## 2023-10-26 PROCEDURE — 93010 ELECTROCARDIOGRAM REPORT: CPT | Mod: S$GLB,,, | Performed by: INTERNAL MEDICINE

## 2023-10-26 PROCEDURE — 3074F SYST BP LT 130 MM HG: CPT | Mod: CPTII,S$GLB,, | Performed by: INTERNAL MEDICINE

## 2023-10-26 PROCEDURE — 99999 PR PBB SHADOW E&M-EST. PATIENT-LVL III: ICD-10-PCS | Mod: PBBFAC,,, | Performed by: INTERNAL MEDICINE

## 2023-10-26 PROCEDURE — 93005 ELECTROCARDIOGRAM TRACING: CPT | Mod: S$GLB,,, | Performed by: INTERNAL MEDICINE

## 2023-10-26 PROCEDURE — 3078F PR MOST RECENT DIASTOLIC BLOOD PRESSURE < 80 MM HG: ICD-10-PCS | Mod: CPTII,S$GLB,, | Performed by: INTERNAL MEDICINE

## 2023-10-26 PROCEDURE — 3074F PR MOST RECENT SYSTOLIC BLOOD PRESSURE < 130 MM HG: ICD-10-PCS | Mod: CPTII,S$GLB,, | Performed by: INTERNAL MEDICINE

## 2023-10-26 PROCEDURE — 3078F DIAST BP <80 MM HG: CPT | Mod: CPTII,S$GLB,, | Performed by: INTERNAL MEDICINE

## 2023-10-26 PROCEDURE — 99205 OFFICE O/P NEW HI 60 MIN: CPT | Mod: S$GLB,,, | Performed by: INTERNAL MEDICINE

## 2023-10-26 PROCEDURE — 3008F PR BODY MASS INDEX (BMI) DOCUMENTED: ICD-10-PCS | Mod: CPTII,S$GLB,, | Performed by: INTERNAL MEDICINE

## 2023-10-26 PROCEDURE — 1159F PR MEDICATION LIST DOCUMENTED IN MEDICAL RECORD: ICD-10-PCS | Mod: CPTII,S$GLB,, | Performed by: INTERNAL MEDICINE

## 2023-10-26 PROCEDURE — 3008F BODY MASS INDEX DOCD: CPT | Mod: CPTII,S$GLB,, | Performed by: INTERNAL MEDICINE

## 2023-10-26 NOTE — PROGRESS NOTES
Subjective:   Patient ID:  Ten Parr is a 63 y.o. male who presents for evaluation of Atrial Fibrillation    Referring Cardiologist: Pedro Gonzales MD    HPI  I had the pleasure of seeing Mr. Parr today in our electrophysiology clinic in consultation for his atrial fibrillation. As you are aware he is a pleasant 63 year-old man with symptomatic paroxysmal atrial fibrillation and RBBB. He was in his usual state of health until January of 2023 when he was lying down one night, began having sharp pain all over his body, got up to walk to the bathroom to urinate, got light-headed and dizzy. Sat down on the toilet then was walking back to his bed and passed out. He went to the ER however left prior to evaluation. He saw Dr. Gonzales. An event monitor was ordered during which there were symptomatic and auto-activations for paroxysms of AF with RVR and at times likely organized atrial activity with ventricular rates up to 178 bpm. He was prescribed prn metoprolol. He felt like alcohol was a trigger. He presents for discussion.    2/2023 echo noted a structurally normal heart with normal LV function  5/2023 coronary angiogram noted no obstructive coronary artery disease      I reviewed available ECGs in Epic which show sinus rhythm with RBBB.    Review of Systems   Constitutional: Negative for fever and malaise/fatigue.   HENT:  Negative for congestion and sore throat.    Eyes:  Negative for blurred vision and visual disturbance.   Cardiovascular:  Positive for irregular heartbeat and palpitations. Negative for chest pain, dyspnea on exertion, near-syncope and syncope.   Respiratory:  Negative for cough and shortness of breath.    Hematologic/Lymphatic: Negative for bleeding problem. Does not bruise/bleed easily.   Skin: Negative.    Musculoskeletal: Negative.    Gastrointestinal:  Negative for bloating, abdominal pain, hematochezia and melena.   Neurological:  Negative for focal weakness and weakness.    Psychiatric/Behavioral: Negative.         Objective:   Physical Exam  Vitals reviewed.   Constitutional:       General: He is not in acute distress.     Appearance: He is well-developed. He is not diaphoretic.   HENT:      Head: Normocephalic and atraumatic.   Eyes:      General:         Right eye: No discharge.         Left eye: No discharge.      Conjunctiva/sclera: Conjunctivae normal.   Cardiovascular:      Rate and Rhythm: Normal rate and regular rhythm.      Heart sounds: No murmur heard.     No friction rub. No gallop.   Pulmonary:      Effort: Pulmonary effort is normal. No respiratory distress.      Breath sounds: Normal breath sounds. No wheezing or rales.   Abdominal:      General: Bowel sounds are normal. There is no distension.      Palpations: Abdomen is soft.      Tenderness: There is no abdominal tenderness.   Musculoskeletal:      Cervical back: Neck supple.   Skin:     General: Skin is warm and dry.   Neurological:      Mental Status: He is alert and oriented to person, place, and time.   Psychiatric:         Behavior: Behavior normal.         Thought Content: Thought content normal.         Judgment: Judgment normal.         Assessment:      1. PAF (paroxysmal atrial fibrillation)    2. RBBB        Plan:     In summary, Mr. Parr is a pleasant 63 year-old man with symptomatic paroxysmal atrial fibrillation and RBBB. I had a long discussion with the patient about the pathophysiology and risks of atrial fibrillation and its basic pathophysiology, including its health implications and treatment options. Specifically, I addressed the need for CVA (stroke) prophylaxis with aspirin versus oral anticoagulation (warfarin vs DOACs, discussed bleeding risks, and need to come to the ER for any head trauma for CT scanning even if asymptomatic). [id number] score is 0 and oral anticoagulation is not indicated. I also discussed the goal to reduce symptomatic arrhythmic episodes by pharmacologic and/or  procedural methods and utilizing a rhythm versus a rate control strategy. Due to RBBB class 1c agents are not recommended. He also has, at times, resting sinus bradycardia with likelihood of tolerating any anti-arrhythmic drug of a low probability, other than dofetilide. Discussed ablation. I spent about a half hour discussing the nature of PVI including transseptal puncture. We discussed risks and benefits at length. Our discussion included, but was not limited to the risk of death, infection, bleeding, stroke, MI, cardiac perforation, embolism, cardiac tamponade, vascular injury, valvular injury, AE fistula, injury to phrenic nerve, pulmonary vein stenosis and other organic injury including the possibility for need for surgery or pacemaker implantation.  He understood and would like to consider.    RTC in 3 months. Should he elect to proceed with PVI he will contact me in the interim and we will arrange.      Thank you for allowing me to participate in the care of this patient. Please do not hesitate to call me with any questions or concerns.    Narayan Tan MD, PhD  Cardiac Electrophysiology

## 2023-11-01 ENCOUNTER — PATIENT MESSAGE (OUTPATIENT)
Dept: ELECTROPHYSIOLOGY | Facility: CLINIC | Age: 63
End: 2023-11-01
Payer: COMMERCIAL

## 2023-11-06 ENCOUNTER — TELEPHONE (OUTPATIENT)
Dept: ELECTROPHYSIOLOGY | Facility: CLINIC | Age: 63
End: 2023-11-06
Payer: COMMERCIAL

## 2023-11-06 DIAGNOSIS — I48.0 PAF (PAROXYSMAL ATRIAL FIBRILLATION): Primary | ICD-10-CM

## 2023-11-06 DIAGNOSIS — R55 SYNCOPE AND COLLAPSE: ICD-10-CM

## 2023-11-06 DIAGNOSIS — Z01.818 PRE-OP TESTING: ICD-10-CM

## 2023-11-06 DIAGNOSIS — R00.2 PALPITATIONS: ICD-10-CM

## 2023-11-06 DIAGNOSIS — I49.9 CARDIAC ARRHYTHMIA, UNSPECIFIED CARDIAC ARRHYTHMIA TYPE: ICD-10-CM

## 2023-11-06 NOTE — TELEPHONE ENCOUNTER
Spoke with Patient . Scheduled for PVI ablation procedure on 12/14/23 with Dr Tan. Procedure details reviewed and advised that pre procedure patient instructions will be sent via patient portal as requested. Advised to call the office for any questions or concerns prior to scheduled procedure. Understanding verbalized.

## 2023-11-07 ENCOUNTER — TELEPHONE (OUTPATIENT)
Dept: ELECTROPHYSIOLOGY | Facility: CLINIC | Age: 63
End: 2023-11-07
Payer: COMMERCIAL

## 2023-11-07 DIAGNOSIS — I48.0 PAROXYSMAL ATRIAL FIBRILLATION: Primary | ICD-10-CM

## 2023-11-07 NOTE — TELEPHONE ENCOUNTER
Spoke with Mr. Parr and answered many questions regarding AF and triggers. He wants to proceed with ablation. Discussed starting OAC 2 weeks prior. Will start xarelto 20mg with dinner. Discussed AAD therapy for 3 months after (would give multaq due to RBBB) as well as esophageal/GI protection for 30 days after.

## 2023-12-11 ENCOUNTER — PATIENT MESSAGE (OUTPATIENT)
Dept: CARDIOLOGY | Facility: CLINIC | Age: 63
End: 2023-12-11
Payer: COMMERCIAL

## 2023-12-21 ENCOUNTER — OFFICE VISIT (OUTPATIENT)
Dept: CARDIOLOGY | Facility: CLINIC | Age: 63
End: 2023-12-21
Payer: COMMERCIAL

## 2023-12-21 VITALS
DIASTOLIC BLOOD PRESSURE: 84 MMHG | SYSTOLIC BLOOD PRESSURE: 132 MMHG | OXYGEN SATURATION: 98 % | BODY MASS INDEX: 25.41 KG/M2 | WEIGHT: 161.88 LBS | HEART RATE: 59 BPM | HEIGHT: 67 IN

## 2023-12-21 DIAGNOSIS — I45.10 RBBB: ICD-10-CM

## 2023-12-21 DIAGNOSIS — I48.0 PAF (PAROXYSMAL ATRIAL FIBRILLATION): Primary | ICD-10-CM

## 2023-12-21 PROCEDURE — 3079F DIAST BP 80-89 MM HG: CPT | Mod: CPTII,S$GLB,, | Performed by: INTERNAL MEDICINE

## 2023-12-21 PROCEDURE — 3075F SYST BP GE 130 - 139MM HG: CPT | Mod: CPTII,S$GLB,, | Performed by: INTERNAL MEDICINE

## 2023-12-21 PROCEDURE — 3079F PR MOST RECENT DIASTOLIC BLOOD PRESSURE 80-89 MM HG: ICD-10-PCS | Mod: CPTII,S$GLB,, | Performed by: INTERNAL MEDICINE

## 2023-12-21 PROCEDURE — 3008F BODY MASS INDEX DOCD: CPT | Mod: CPTII,S$GLB,, | Performed by: INTERNAL MEDICINE

## 2023-12-21 PROCEDURE — 1159F PR MEDICATION LIST DOCUMENTED IN MEDICAL RECORD: ICD-10-PCS | Mod: CPTII,S$GLB,, | Performed by: INTERNAL MEDICINE

## 2023-12-21 PROCEDURE — 99999 PR PBB SHADOW E&M-EST. PATIENT-LVL III: ICD-10-PCS | Mod: PBBFAC,,, | Performed by: INTERNAL MEDICINE

## 2023-12-21 PROCEDURE — 99214 PR OFFICE/OUTPT VISIT, EST, LEVL IV, 30-39 MIN: ICD-10-PCS | Mod: S$GLB,,, | Performed by: INTERNAL MEDICINE

## 2023-12-21 PROCEDURE — 1159F MED LIST DOCD IN RCRD: CPT | Mod: CPTII,S$GLB,, | Performed by: INTERNAL MEDICINE

## 2023-12-21 PROCEDURE — 3008F PR BODY MASS INDEX (BMI) DOCUMENTED: ICD-10-PCS | Mod: CPTII,S$GLB,, | Performed by: INTERNAL MEDICINE

## 2023-12-21 PROCEDURE — 99214 OFFICE O/P EST MOD 30 MIN: CPT | Mod: S$GLB,,, | Performed by: INTERNAL MEDICINE

## 2023-12-21 PROCEDURE — 3075F PR MOST RECENT SYSTOLIC BLOOD PRESS GE 130-139MM HG: ICD-10-PCS | Mod: CPTII,S$GLB,, | Performed by: INTERNAL MEDICINE

## 2023-12-21 PROCEDURE — 99999 PR PBB SHADOW E&M-EST. PATIENT-LVL III: CPT | Mod: PBBFAC,,, | Performed by: INTERNAL MEDICINE

## 2023-12-21 NOTE — PROGRESS NOTES
Cardiology    12/21/2023  10:13 AM    Problem list  Patient Active Problem List   Diagnosis    Nasal bones, closed fracture    Syncope and collapse    Palpitations    RBBB    PAF (paroxysmal atrial fibrillation)    Other forms of angina pectoris       CC:  Follow-up    HPI:  Patient is here for follow-up.  Since his last visit has been seen allergist and was diagnosed with eosinophilic esophagitis during an endoscopy.  He is currently is undergoing further evaluation and trial and error to find any trigger for his eosinophilic esophagitis.  He has postpone his AFib ablation.  He was given cromolyn but he did not tolerate.    Medications  Current Outpatient Medications   Medication Sig Dispense Refill    metoprolol tartrate (LOPRESSOR) 25 MG tablet Take 1 tablet (25 mg total) by mouth 2 (two) times daily as needed (palpitations). 60 tablet 11    aluminum-magnesium hydroxide-simethicone (MAALOX) 200-200-20 mg/5 mL Susp Take 30 mLs by mouth 4 (four) times daily before meals and nightly. for 7 days 354 mL 0    omeprazole (PRILOSEC) 20 MG capsule Take 1 capsule (20 mg total) by mouth once daily. 30 capsule 0    rivaroxaban (XARELTO) 20 mg Tab Take 1 tablet (20 mg total) by mouth daily with dinner or evening meal. (Patient not taking: Reported on 12/21/2023) 30 tablet 3     No current facility-administered medications for this visit.      Prior to Admission medications    Medication Sig Start Date End Date Taking? Authorizing Provider   metoprolol tartrate (LOPRESSOR) 25 MG tablet Take 1 tablet (25 mg total) by mouth 2 (two) times daily as needed (palpitations). 3/22/23 3/21/24 Yes Pedro Gonzales MD   aluminum-magnesium hydroxide-simethicone (MAALOX) 200-200-20 mg/5 mL Susp Take 30 mLs by mouth 4 (four) times daily before meals and nightly. for 7 days 8/11/23 8/18/23  Hugh Watkins PA-C   omeprazole (PRILOSEC) 20 MG capsule Take 1 capsule (20 mg total) by mouth once daily. 8/11/23 9/10/23  Hugh Watkins PA-C    rivaroxaban (XARELTO) 20 mg Tab Take 1 tablet (20 mg total) by mouth daily with dinner or evening meal.  Patient not taking: Reported on 12/21/2023 11/7/23   Narayan Tan MD         History  Past Medical History:   Diagnosis Date    Paroxysmal atrial fibrillation      Past Surgical History:   Procedure Laterality Date    CORONARY ANGIOGRAPHY N/A 5/25/2023    Procedure: ANGIOGRAM, CORONARY ARTERY;  Surgeon: Pedro Gonzales MD;  Location: [facility] CATH LAB;  Service: Cardiology;  Laterality: N/A;    NASAL SEPTUM SURGERY      RHINOPLASTY TIP       Social History     Socioeconomic History    Marital status: Single   Tobacco Use    Smoking status: Former     Current packs/day: 1.00     Average packs/day: 1 pack/day for 21.3 years (21.3 ttl pk-yrs)     Types: Cigarettes     Start date: 9/9/2002    Smokeless tobacco: Never   Substance and Sexual Activity    Alcohol use: Yes     Comment: socially    Drug use: Never         Allergies  Review of patient's allergies indicates:  No Known Allergies      Review of Systems   Review of Systems   Constitutional: Negative for decreased appetite, fever and weight loss.   HENT:  Negative for congestion and nosebleeds.    Eyes:  Negative for double vision, vision loss in left eye, vision loss in right eye and visual disturbance.   Cardiovascular:  Negative for chest pain, claudication, cyanosis, dyspnea on exertion, irregular heartbeat, leg swelling, near-syncope, orthopnea, palpitations, paroxysmal nocturnal dyspnea and syncope.   Respiratory:  Negative for cough, hemoptysis, shortness of breath, sleep disturbances due to breathing, snoring, sputum production and wheezing.    Endocrine: Negative for cold intolerance and heat intolerance.   Skin:  Negative for nail changes and rash.   Musculoskeletal:  Negative for joint pain, muscle cramps, muscle weakness and myalgias.   Gastrointestinal:  Negative for change in bowel habit, heartburn, hematemesis, hematochezia, hemorrhoids and  melena.   Neurological:  Negative for dizziness, focal weakness and headaches.         Physical Exam  Wt Readings from Last 1 Encounters:   12/21/23 73.4 kg (161 lb 14.4 oz)     BP Readings from Last 3 Encounters:   12/21/23 132/84   10/26/23 122/76   08/11/23 127/77     Pulse Readings from Last 1 Encounters:   12/21/23 (!) 59     Body mass index is 25.36 kg/m².    Physical Exam  Constitutional:       Appearance: He is well-developed.   HENT:      Head: Atraumatic.   Eyes:      General: No scleral icterus.  Neck:      Vascular: Normal carotid pulses. No carotid bruit, hepatojugular reflux or JVD.   Cardiovascular:      Rate and Rhythm: Normal rate and regular rhythm.      Chest Wall: PMI is not displaced.      Pulses: Intact distal pulses.           Carotid pulses are 2+ on the right side and 2+ on the left side.       Radial pulses are 2+ on the right side and 2+ on the left side.        Dorsalis pedis pulses are 2+ on the right side and 2+ on the left side.      Heart sounds: Normal heart sounds, S1 normal and S2 normal. No murmur heard.     No friction rub.   Pulmonary:      Effort: Pulmonary effort is normal. No respiratory distress.      Breath sounds: Normal breath sounds. No stridor. No wheezing or rales.   Chest:      Chest wall: No tenderness.   Abdominal:      General: Bowel sounds are normal.      Palpations: Abdomen is soft.   Musculoskeletal:      Cervical back: Neck supple. No edema.   Skin:     General: Skin is warm and dry.      Nails: There is no clubbing.   Neurological:      Mental Status: He is alert and oriented to person, place, and time.   Psychiatric:         Behavior: Behavior normal.         Thought Content: Thought content normal.             Assessment  1. PAF (paroxysmal atrial fibrillation)  Stable    2. RBBB  Stable        Plan and Discussion  Continue current medications.  Patient would like to postpone his atrial fibrillation ablation until sort out his eosinophilic esophagitis.   Overall, patient states he is doing better.    Follow Up  6 months      Pedro Gonzales MD, F.A.C.C, F.S.C.A.I.        30 minutes were spent in chart review, documentation and review of results, and evaluation, treatment, and counseling of patient on the same day of service.    Disclaimer: This document was created using voice recognition software ([employer]). Although it may be edited, this document may contain errors related to incorrect recognition of the spoken word. Please call the physician if clarification is needed.

## 2024-01-10 ENCOUNTER — TELEPHONE (OUTPATIENT)
Dept: CARDIOLOGY | Facility: CLINIC | Age: 64
End: 2024-01-10
Payer: COMMERCIAL

## 2024-01-10 ENCOUNTER — PATIENT MESSAGE (OUTPATIENT)
Dept: CARDIOLOGY | Facility: CLINIC | Age: 64
End: 2024-01-10
Payer: COMMERCIAL

## 2024-01-10 NOTE — TELEPHONE ENCOUNTER
Discussed with Dr. Gonzales. Informed pt he can take another dose of Metoprolol today at 10:30am. Pt instructed to bring Metoprolol and BP cuff with him on his trip. Advised pt to stay well hydrated and avoid caffeine.     Pt will call the office back when he gets back from his trip if he feels like he needs to be seen sooner. Pt verbalized understanding of conversation.

## 2024-01-10 NOTE — TELEPHONE ENCOUNTER
Pt states his heart rate is irregular, rate between  bpm. Pt is asymptomatic. He took Metoprolol tartrate 25 mg at 4:30 am and 6:30 am. BP while on the phone was 112/71. He is concerned because he is leaving to go out of the country tomorrow for 3 weeks. Will discuss with Dr. Gonzales and call pt back. He verbalized understanding.

## 2024-08-13 ENCOUNTER — HOSPITAL ENCOUNTER (EMERGENCY)
Facility: OTHER | Age: 64
Discharge: HOME | End: 2024-08-13
Attending: EMERGENCY MEDICINE
Payer: COMMERCIAL

## 2024-08-13 VITALS
SYSTOLIC BLOOD PRESSURE: 130 MMHG | HEIGHT: 67 IN | HEART RATE: 75 BPM | TEMPERATURE: 98 F | BODY MASS INDEX: 23.86 KG/M2 | WEIGHT: 152 LBS | RESPIRATION RATE: 16 BRPM | DIASTOLIC BLOOD PRESSURE: 81 MMHG | OXYGEN SATURATION: 98 %

## 2024-08-13 DIAGNOSIS — R09.A2 SENSATION OF FOREIGN BODY IN ESOPHAGUS: Primary | ICD-10-CM

## 2024-08-13 PROCEDURE — 99281 EMR DPT VST MAYX REQ PHY/QHP: CPT

## 2024-08-13 NOTE — ED PROVIDER NOTES
Source of History:  Patient     Chief complaint:  Food Bolus (Pt presents to the ER with complaints of food stuck in his throat x 30 minutes while eating a sandwich. Pt reports a hx of a narrowed esophagus; no previous food bolus.Pt states he has attempted to drink water but it immediately comes back up. Pt denies trouble breathing./)      HPI:  Ten Parr is a 63 y.o. male presenting with complaint of food bolus for the last 30 minutes that occurred after eating a sandwich.  States he has been unable tolerate any oral intake he feels a pressure in his chest.  Reports immediate vomiting.  Reports history of eosinophilic esophagitis and has had required EGDs in the past.  He denies any difficulty breathing.    This is the extent to the patients complaints today here in the emergency department.    PMH:  As per HPI and below:  Past Medical History:   Diagnosis Date    Paroxysmal atrial fibrillation      Past Surgical History:   Procedure Laterality Date    CORONARY ANGIOGRAPHY N/A 5/25/2023    Procedure: ANGIOGRAM, CORONARY ARTERY;  Surgeon: Pedro Gonzales MD;  Location: [facility] [place] CATH LAB;  Service: Cardiology;  Laterality: N/A;    NASAL SEPTUM SURGERY      RHINOPLASTY TIP         Social History     Tobacco Use    Smoking status: Former     Current packs/day: 1.00     Average packs/day: 1 pack/day for 21.9 years (21.9 ttl pk-yrs)     Types: Cigarettes     Start date: 9/9/2002    Smokeless tobacco: Never   Substance Use Topics    Alcohol use: Yes     Comment: socially    Drug use: Never       Review of patient's allergies indicates:  No Known Allergies    ROS: As per HPI and below:  Constitutional: No  fever.  No chills.  Eyes: No visual changes.  ENT:  Food bolus   Neck:  No neck pain  Cardiovascular:  Chest pressure fresh  Respiratory: No shortness of breath.  GI: No abdominal pain.  No nausea or vomiting.  Genitourinary: No abnormal urination.  Neurologic: No headache. No focal weakness.  No numbness.  MSK: no back  "pain.    Physical Exam:    /81 (BP Location: Left arm)   Pulse 75   Temp 98 °F (36.7 °C) (Oral)   Resp 16   Ht 5' 7" (1.702 m)   Wt 68.9 kg (152 lb)   SpO2 98%   BMI 23.81 kg/m²   Vitals:    08/13/24 1228   BP: 130/81   Pulse: 75   Resp: 16   Temp: 98 °F (36.7 °C)   TempSrc: Oral   SpO2: 98%   Weight: 68.9 kg (152 lb)   Height: 5' 7" (1.702 m)       Nursing note and vital signs reviewed.  Constitutional: No acute distress.  Eyes: No conjunctival injection.  Extraocular muscles are intact.  Throat:  No posterior pharynx erythema or swelling, speaking in complete sentences.  Neck:  No neck swelling.  Respiratory: Good air movement.  No wheezes.  No rhonchi. No rales. No accessory muscle use.  Musculoskeletal: Neck supple.  No meningismus.  Skin: No rashes seen.  Good turgor.  No abrasions.  No ecchymoses.  Neuro: alert and oriented x3,  no focal neurological deficits.    Abnormal Labs Reviewed - No data to display    No orders to display         Initial Impression/ Differential Dx:  Food bolus, esophagitis, GERD, acid reflux    MDM:    Medical Decision Making  63-year-old male with a history of esophagitis presents for evaluation of foreign body sensation in his throat, states that he was eating a sandwich and began having some chest pressure and unable tolerate any oral intake.  Reports he immediately vomits when he attempts to drink any water.  Symptoms began 30 minutes ago.  On arrival to the emergency department patient reports symptoms had improved.  He was able tolerate 2 glasses of water.  Patient does not have close follow up with GI.  Urgent referral was placed for the patient and discussed needs to call and schedule an appointment.  Discussed soft food diet until he follows up with GI.        ED Course as of 08/13/24 1522   Tue Aug 13, 2024   1241 Patient was able to tolerate 2 cups of water without vomiting, he also reported food bolus sensation has resolved.  States "I think it went down " "finally"    Patient will be monitored for 15 mins to assess for any vomiting.   Urgent referral to GI placed and will provide with contact information for clinics.   [AS]      ED Course User Index  [AS] So Erickson FNP       Diagnostic Impression:    1. Sensation of foreign body in esophagus         ED Disposition Condition    Discharge Stable            ED Prescriptions    None       Follow-up Information       Follow up With Specialties Details Why Contact Info Additional Information    Locations, [facility] Call   [address]  [address]  [place] [address]  [phone]       [facility] Call   [address] [address] [phone] [facility] - Main Building, 4th Floor Please park in [facility] and take [facility] elevator             So Erickson FNP  08/13/24 1522    "

## 2024-08-13 NOTE — ED NOTES
Pt able to keep water down. Reports complete resolution of symptoms. Discharge instructions reviewed. Advised to come to ED for any new, returning, or worsening symptoms.

## 2024-08-14 ENCOUNTER — TELEPHONE (OUTPATIENT)
Dept: ORTHOPEDICS | Facility: CLINIC | Age: 64
End: 2024-08-14
Payer: COMMERCIAL

## 2024-09-05 RX ORDER — METOPROLOL TARTRATE 25 MG/1
25 TABLET, FILM COATED ORAL 2 TIMES DAILY PRN
Qty: 60 TABLET | Refills: 6 | Status: SHIPPED | OUTPATIENT
Start: 2024-09-05 | End: 2025-09-05

## (undated) DEVICE — GUIDEWIRE ANGIO 1.5MM .035X180

## (undated) DEVICE — CATH OPTITORQUE RADIAL 5FR

## (undated) DEVICE — DRAPE ANGIO BRACH 38X44IN

## (undated) DEVICE — KIT PROBE COVER WITH GEL

## (undated) DEVICE — BAG SNAP KOVER BAND 36 X 28 IN

## (undated) DEVICE — HEMOSTAT VASC BAND REG 24CM

## (undated) DEVICE — TRAY CORONARY CUSTOM BAPTIST

## (undated) DEVICE — KIT GLIDESHEATH SLEND 6FR 10CM

## (undated) DEVICE — MANIFOLD PERCEPTOR MP 3P RH ON

## (undated) DEVICE — OMNIPAQUE CONTRAST 350MG/100ML

## (undated) DEVICE — BAG DRAINAGE W/SPIKE

## (undated) DEVICE — SYR 10ML LIDOCAINE

## (undated) DEVICE — OXISENSOR ADULT DIGIT N/S